# Patient Record
Sex: MALE | Race: WHITE | NOT HISPANIC OR LATINO | Employment: OTHER | URBAN - NONMETROPOLITAN AREA
[De-identification: names, ages, dates, MRNs, and addresses within clinical notes are randomized per-mention and may not be internally consistent; named-entity substitution may affect disease eponyms.]

---

## 2020-08-03 ENCOUNTER — TRANSCRIBE ORDERS (OUTPATIENT)
Dept: ADMINISTRATIVE | Facility: HOSPITAL | Age: 60
End: 2020-08-03

## 2020-08-03 DIAGNOSIS — Z01.818 OTHER SPECIFIED PRE-OPERATIVE EXAMINATION: Primary | ICD-10-CM

## 2021-02-18 ENCOUNTER — IMMUNIZATION (OUTPATIENT)
Dept: VACCINE CLINIC | Facility: HOSPITAL | Age: 61
End: 2021-02-18

## 2021-02-18 PROCEDURE — 91300 HC SARSCOV02 VAC 30MCG/0.3ML IM: CPT | Performed by: INTERNAL MEDICINE

## 2021-02-18 PROCEDURE — 0001A: CPT | Performed by: INTERNAL MEDICINE

## 2021-02-26 ENCOUNTER — OFFICE VISIT (OUTPATIENT)
Dept: ENDOCRINOLOGY | Facility: CLINIC | Age: 61
End: 2021-02-26

## 2021-02-26 VITALS
SYSTOLIC BLOOD PRESSURE: 144 MMHG | HEART RATE: 77 BPM | OXYGEN SATURATION: 94 % | DIASTOLIC BLOOD PRESSURE: 86 MMHG | WEIGHT: 315 LBS | BODY MASS INDEX: 40.43 KG/M2 | TEMPERATURE: 97.1 F | HEIGHT: 74 IN

## 2021-02-26 DIAGNOSIS — E10.649 TYPE 1 DIABETES MELLITUS WITH HYPOGLYCEMIA AND WITHOUT COMA (HCC): Primary | ICD-10-CM

## 2021-02-26 LAB — HBA1C MFR BLD: 6.6 %

## 2021-02-26 PROCEDURE — 95251 CONT GLUC MNTR ANALYSIS I&R: CPT | Performed by: INTERNAL MEDICINE

## 2021-02-26 PROCEDURE — 99213 OFFICE O/P EST LOW 20 MIN: CPT | Performed by: INTERNAL MEDICINE

## 2021-02-26 RX ORDER — BLOOD-GLUCOSE METER
EACH MISCELLANEOUS
Qty: 1 KIT | Refills: 0 | Status: SHIPPED | OUTPATIENT
Start: 2021-02-26

## 2021-02-26 RX ORDER — INSULIN PUMP CARTRIDGE
CARTRIDGE (EA) SUBCUTANEOUS
COMMUNITY
Start: 2020-12-19 | End: 2021-05-12

## 2021-02-26 RX ORDER — BACLOFEN 10 MG/1
10 TABLET ORAL DAILY PRN
COMMUNITY
Start: 2020-12-14

## 2021-02-26 RX ORDER — INFUSION SET FOR INSULIN PUMP
INFUSION SETS-PARAPHERNALIA MISCELLANEOUS
COMMUNITY
Start: 2020-12-19 | End: 2021-05-12

## 2021-02-26 RX ORDER — VALSARTAN AND HYDROCHLOROTHIAZIDE 320; 12.5 MG/1; MG/1
1 TABLET, FILM COATED ORAL DAILY
COMMUNITY
Start: 2021-02-03

## 2021-02-26 RX ORDER — PROCHLORPERAZINE 25 MG/1
SUPPOSITORY RECTAL
COMMUNITY
Start: 2021-02-03 | End: 2021-09-27

## 2021-02-26 RX ORDER — ATORVASTATIN CALCIUM 40 MG/1
40 TABLET, FILM COATED ORAL NIGHTLY
COMMUNITY
Start: 2020-12-29

## 2021-02-26 RX ORDER — BUDESONIDE 3 MG/1
3 CAPSULE, COATED PELLETS ORAL NIGHTLY
COMMUNITY
Start: 2021-02-03

## 2021-02-26 NOTE — PROGRESS NOTES
"     Office Note      Date: 2021  Patient Name: Piotr Kc  MRN: [id number]  : 1960    Chief Complaint   Patient presents with   • Diabetes       History of Present Illness:   Piotr Kc is a 60 y.o. male who presents for Diabetes type 1. Diagnosed in: . Treated in past with insulin. Current treatments: insulin in a tandem pump using control Iq. Number of insulin shots per day: none. Is on a pump  Checks blood sugar >4 times a day. Has low blood sugar: occasional.   His pump automatically adjusts insulin every 5 minutes to control his blood sugar      Changes in health since last visit: none. Last eye exam up to date  Full labs are up to date  None .    dexcom report shows mild hyperglycemia after supper.. has had some nocturnal hypoglycemia.. 2 % of the time according to the report     Recent a1c was 6.6        Review of Systems:   Review of Systems   Constitutional: Negative.    HENT: Negative.    Eyes: Negative.    Respiratory: Negative.    All other systems reviewed and are negative.      The following portions of the patient's history were reviewed and updated as appropriate: allergies, current medications, past family history, past medical history, past social history, past surgical history and problem list.    Objective     Visit Vitals  /86   Pulse 77   Temp 97.1 °F (36.2 °C) (Infrared)   Ht 188 cm (74\")   Wt (!) 158 kg (348 lb 9.6 oz)   SpO2 94%   BMI 44.76 kg/m²       Labs:    CMP  No results found for: GLUCOSE, BUN, CREATININE, EGFRIFNONA, EGFRIFAFRI, BCR, K, CO2, CALCIUM, PROTENTOTREF, LABIL2, BILIRUBIN, AST, ALT     CBC w/DIFF  No results found for: WBC, RBC, HGB, HCT, MCV, MCH, MCHC, RDW, RDWSD, MPV, PLT, NEUTRORELPCT, LYMPHORELPCT, MONORELPCT, EOSRELPCT, BASORELPCT, AUTOIGPER, NEUTROABS, LYMPHSABS, MONOSABS, EOSABS, BASOSABS, AUTOIGNUM, NRBC    Physical Exam:  Physical Exam  Constitutional:       Appearance: Normal appearance. He is obese.   HENT:      Head: " Normocephalic and atraumatic.   Neurological:      Mental Status: He is alert and oriented to person, place, and time.   Psychiatric:         Mood and Affect: Mood normal.         Thought Content: Thought content normal.         Judgment: Judgment normal.          Assessment / Plan      Assessment & Plan:  Problem List Items Addressed This Visit        Other    Type 1 diabetes mellitus with hypoglycemia (CMS/HCC) - Primary    Current Assessment & Plan     Diabetes is improving with treatment.   Continue current treatment regimen.  Diabetes will be reassessed in 6 months     WILL REDUCE NOCTURNAL BASAL RATE DUE TO NOCTURNAL HYPOGLYCEMIA.         Relevant Medications    NovoLOG 100 UNIT/ML injection           Teo Townsend MD   02/26/2021

## 2021-02-26 NOTE — ASSESSMENT & PLAN NOTE
Diabetes is improving with treatment.   Continue current treatment regimen.  Diabetes will be reassessed in 6 months     WILL REDUCE NOCTURNAL BASAL RATE DUE TO NOCTURNAL HYPOGLYCEMIA.

## 2021-03-11 ENCOUNTER — IMMUNIZATION (OUTPATIENT)
Dept: VACCINE CLINIC | Facility: HOSPITAL | Age: 61
End: 2021-03-11

## 2021-03-11 PROCEDURE — 0002A: CPT | Performed by: INTERNAL MEDICINE

## 2021-03-11 PROCEDURE — 91300 HC SARSCOV02 VAC 30MCG/0.3ML IM: CPT | Performed by: INTERNAL MEDICINE

## 2021-05-10 ENCOUNTER — PRIOR AUTHORIZATION (OUTPATIENT)
Dept: ENDOCRINOLOGY | Facility: CLINIC | Age: 61
End: 2021-05-10

## 2021-05-10 NOTE — TELEPHONE ENCOUNTER
[employer] Key: [id number] - PA Case ID: [id number] help? Call us at [phone]  Outcome  Appro[place] May 7  Your PA request has been approved. Additional information will be provided in the approval communication. (Message 1145)  Drug  T:slim X2 3mL Cartridge  Form  [employer] PA Form (2017 [employer])

## 2021-05-12 ENCOUNTER — TELEPHONE (OUTPATIENT)
Dept: ENDOCRINOLOGY | Facility: CLINIC | Age: 61
End: 2021-05-12

## 2021-05-12 RX ORDER — INFUSION SET FOR INSULIN PUMP
1 INFUSION SETS-PARAPHERNALIA MISCELLANEOUS
Qty: 45 EACH | Refills: 3 | Status: SHIPPED | OUTPATIENT
Start: 2021-05-12 | End: 2022-04-26

## 2021-05-12 RX ORDER — INSULIN PUMP CARTRIDGE
1 CARTRIDGE (EA) SUBCUTANEOUS
Qty: 45 EACH | Refills: 3 | Status: SHIPPED | OUTPATIENT
Start: 2021-05-12 | End: 2022-04-26

## 2021-05-12 NOTE — TELEPHONE ENCOUNTER
Your PA request has been closed. [id number]  Drug  AutoSoft XC Infusion Set  Form  [facility] Electronic PA Form (2017 NCPDP)

## 2021-07-21 ENCOUNTER — TRANSCRIBE ORDERS (OUTPATIENT)
Dept: ADMINISTRATIVE | Facility: HOSPITAL | Age: 61
End: 2021-07-21

## 2021-07-21 ENCOUNTER — HOSPITAL ENCOUNTER (OUTPATIENT)
Dept: GENERAL RADIOLOGY | Facility: HOSPITAL | Age: 61
Discharge: HOME | End: 2021-07-21
Admitting: INTERNAL MEDICINE

## 2021-07-21 DIAGNOSIS — K50.80 CROHN'S DISEASE OF BOTH SMALL AND LARGE INTESTINE WITHOUT COMPLICATION (HCC): Primary | ICD-10-CM

## 2021-07-21 DIAGNOSIS — K50.80 CROHN'S DISEASE OF BOTH SMALL AND LARGE INTESTINE WITHOUT COMPLICATION (HCC): ICD-10-CM

## 2021-07-21 PROCEDURE — 74250 X-RAY XM SM INT 1CNTRST STD: CPT

## 2021-07-21 PROCEDURE — 74250 X-RAY XM SM INT 1CNTRST STD: CPT | Performed by: RADIOLOGY

## 2021-09-09 ENCOUNTER — OFFICE VISIT (OUTPATIENT)
Dept: ENDOCRINOLOGY | Facility: CLINIC | Age: 61
End: 2021-09-09

## 2021-09-09 VITALS
OXYGEN SATURATION: 97 % | TEMPERATURE: 97.3 F | WEIGHT: 315 LBS | HEIGHT: 74 IN | DIASTOLIC BLOOD PRESSURE: 72 MMHG | SYSTOLIC BLOOD PRESSURE: 120 MMHG | BODY MASS INDEX: 40.43 KG/M2 | HEART RATE: 88 BPM | RESPIRATION RATE: 20 BRPM

## 2021-09-09 DIAGNOSIS — E10.649 TYPE 1 DIABETES MELLITUS WITH HYPOGLYCEMIA AND WITHOUT COMA (HCC): Primary | ICD-10-CM

## 2021-09-09 LAB — HBA1C MFR BLD: 6.7 %

## 2021-09-09 PROCEDURE — 99213 OFFICE O/P EST LOW 20 MIN: CPT | Performed by: INTERNAL MEDICINE

## 2021-09-09 RX ORDER — DEXLANSOPRAZOLE 60 MG/1
60 CAPSULE, DELAYED RELEASE ORAL 2 TIMES DAILY
COMMUNITY
End: 2022-02-10

## 2021-09-09 RX ORDER — FAMOTIDINE 40 MG/1
40 TABLET, FILM COATED ORAL DAILY
COMMUNITY
End: 2023-02-09

## 2021-09-09 NOTE — PROGRESS NOTES
"     Office Note      Date: 2021  Patient Name: Piotr Kc  MRN: [id number]  : 1960    Chief Complaint   Patient presents with   • Diabetes     follow up       History of Present Illness:   Piotr Kc is a 60 y.o. male who presents for Diabetes - type1.  Uses insulin in a tandem pump with dexcom.  Bg is check 288 times per day with dexcom.  Insulin doses are adjusted frequently based upon the readings.  Patient has occasional hypoglycemia.  Patient has been using the system during the last 3 months.    He had several issues of severe hypoglycemia that occurred when the sensor indicated high blood sugar when, in fact, his sugar was not high.  He then gave boluses and this led to hypoglycemia.   With one of those he had a bad fall and broke his leg.    Also lost weight because he was sick with gastric ulcers    Last A1c:  Hemoglobin A1C   Date Value Ref Range Status   2021 6.7 % Final   12/10/2020 6.6  Final       Changes in health since last visit: see above . Last eye exam scheduled.     Subjective          Review of Systems:   Review of Systems   Constitutional: Negative.    HENT: Negative.        The following portions of the patient's history were reviewed and updated as appropriate: allergies, current medications, past family history, past medical history, past social history, past surgical history and problem list.    Objective     Visit Vitals  /72 (BP Location: Right arm, Patient Position: Sitting, Cuff Size: Adult)   Pulse 88   Temp 97.3 °F (36.3 °C) (Infrared)   Resp 20   Ht 188 cm (74\")   Wt (!) 150 kg (331 lb 6.4 oz)   SpO2 97%   BMI 42.55 kg/m²       Labs:    CMP  No results found for: GLUCOSE, BUN, CREATININE, EGFRIFNONA, EGFRIFAFRI, BCR, K, CO2, CALCIUM, PROTENTOTREF, LABIL2, BILIRUBIN, AST, ALT     CBC w/DIFF  No results found for: WBC, RBC, HGB, HCT, MCV, MCH, MCHC, RDW, RDWSD, MPV, PLT, NEUTRORELPCT, LYMPHORELPCT, MONORELPCT, EOSRELPCT, BASORELPCT, " AUTOIGPER, NEUTROABS, LYMPHSABS, MONOSABS, EOSABS, BASOSABS, AUTOIGNUM, NRBC    Physical Exam:  Physical Exam  Vitals reviewed.   Constitutional:       Appearance: Normal appearance. He is normal weight.   Cardiovascular:      Pulses:           Dorsalis pedis pulses are 2+ on the right side and 2+ on the left side.        Posterior tibial pulses are 2+ on the right side and 2+ on the left side.   Musculoskeletal:      Right foot: Normal range of motion. No deformity, bunion, Charcot foot, foot drop or prominent metatarsal heads.      Left foot: Normal range of motion. No deformity, bunion, Charcot foot, foot drop or prominent metatarsal heads.   Feet:      Right foot:      Protective Sensation: 5 sites tested. 5 sites sensed.      Skin integrity: Skin integrity normal.      Toenail Condition: Right toenails are normal.      Left foot:      Protective Sensation: 5 sites tested. 5 sites sensed.      Skin integrity: Skin integrity normal.      Toenail Condition: Left toenails are normal.      Comments: Diabetic Foot Exam Performed and Monofilament Test Performed    Neurological:      Mental Status: He is alert.   Psychiatric:         Mood and Affect: Mood normal.         Thought Content: Thought content normal.         Judgment: Judgment normal.          Assessment / Plan      Assessment & Plan:  Problem List Items Addressed This Visit        Other    Type 1 diabetes mellitus with hypoglycemia (CMS/[facility]) - Primary    Current Assessment & Plan     The a1c is excellent but the hypoglycemia is of concern and is related to sensor malfunction. I will alert the company of the situation. He will calibrate the sensor daily and if things seem out of line will do fingersticks.          Relevant Medications    NovoLOG 100 UNIT/ML injection    Other Relevant Orders    POC Glycosylated Hemoglobin (Hb A1C) (Completed)           Teo Townsend MD   09/09/2021

## 2021-09-09 NOTE — ASSESSMENT & PLAN NOTE
The a1c is excellent but the hypoglycemia is of concern and is related to sensor malfunction. I will alert the company of the situation. He will calibrate the sensor daily and if things seem out of line will do fingersticks.

## 2021-09-10 ENCOUNTER — IMMUNIZATION (OUTPATIENT)
Dept: VACCINE CLINIC | Facility: HOSPITAL | Age: 61
End: 2021-09-10

## 2021-09-10 PROCEDURE — 91300 HC SARSCOV02 VAC 30MCG/0.3ML IM: CPT | Performed by: INTERNAL MEDICINE

## 2021-09-10 PROCEDURE — 0003A: CPT | Performed by: INTERNAL MEDICINE

## 2021-09-27 RX ORDER — PROCHLORPERAZINE 25 MG/1
SUPPOSITORY RECTAL
Qty: 3 EACH | Refills: 3 | Status: SHIPPED | OUTPATIENT
Start: 2021-09-27 | End: 2022-03-21

## 2021-09-27 RX ORDER — PROCHLORPERAZINE 25 MG/1
SUPPOSITORY RECTAL
Qty: 1 EACH | Refills: 3 | Status: SHIPPED | OUTPATIENT
Start: 2021-09-27 | End: 2022-04-21

## 2021-12-17 NOTE — TELEPHONE ENCOUNTER
PT CALLED REQUESTING NOVLOG TO BE SENT [facility] TO [facility] IN [place]. PLEASE AND THANK YOU

## 2021-12-23 NOTE — TELEPHONE ENCOUNTER
PT's wife called and PT is not getting enough insulin filled from the pharmacy, she states he gets 3 bottles and is running out and using about 5000 Units a month of insulin and needs 5 bottles. Averages 165-170 units a day. Please give them a call at [phone] or wife at [phone].    Pharmacy is [facility] in [place].    Pt called 12/17/21 for refills.

## 2021-12-27 ENCOUNTER — PRIOR AUTHORIZATION (OUTPATIENT)
Dept: ENDOCRINOLOGY | Facility: CLINIC | Age: 61
End: 2021-12-27

## 2022-02-10 ENCOUNTER — SPECIALTY PHARMACY (OUTPATIENT)
Dept: PHARMACY | Facility: HOSPITAL | Age: 62
End: 2022-02-10

## 2022-02-10 ENCOUNTER — OFFICE VISIT (OUTPATIENT)
Dept: ENDOCRINOLOGY | Facility: CLINIC | Age: 62
End: 2022-02-10

## 2022-02-10 VITALS
TEMPERATURE: 98.6 F | BODY MASS INDEX: 40.43 KG/M2 | HEART RATE: 85 BPM | DIASTOLIC BLOOD PRESSURE: 76 MMHG | WEIGHT: 315 LBS | HEIGHT: 74 IN | OXYGEN SATURATION: 93 % | SYSTOLIC BLOOD PRESSURE: 144 MMHG

## 2022-02-10 DIAGNOSIS — E10.649 TYPE 1 DIABETES MELLITUS WITH HYPOGLYCEMIA AND WITHOUT COMA: Primary | ICD-10-CM

## 2022-02-10 LAB
EXPIRATION DATE: ABNORMAL
HBA1C MFR BLD: 7.4 %
Lab: ABNORMAL

## 2022-02-10 PROCEDURE — 83036 HEMOGLOBIN GLYCOSYLATED A1C: CPT | Performed by: INTERNAL MEDICINE

## 2022-02-10 PROCEDURE — 95251 CONT GLUC MNTR ANALYSIS I&R: CPT | Performed by: INTERNAL MEDICINE

## 2022-02-10 PROCEDURE — 99213 OFFICE O/P EST LOW 20 MIN: CPT | Performed by: INTERNAL MEDICINE

## 2022-02-10 RX ORDER — DEXLANSOPRAZOLE 30 MG/1
30 CAPSULE, DELAYED RELEASE ORAL DAILY
COMMUNITY
End: 2022-08-11

## 2022-02-10 NOTE — PROGRESS NOTES
Specialty Pharmacy Patient Management Program  Endocrinology Initial Assessment       Piotr Kc is a 61 y.o. male with Type 1 Diabetes seen by an Endocrinology provider and enrolled in the Endocrinology Patient Management program offered by [facility].  An initial outreach was conducted, including assessment of therapy appropriateness and specialty medication education for insulin therapy, CGM.  The patient was introduced to services offered by [facility], including: regular assessments, refill coordination, curbside pick-up or mail order delivery options, prior authorization maintenance, and financial assistance programs as applicable. The patient was also provided with contact information for the pharmacy team.    Patient is using a Tandem pump with Dexcom. He reports some hypoglycemia. Patient has been using the Dexcom + Tandem for the last few months.        Relevant Past Medical History and Comorbidities  Relevant medical history and concomitant health conditions were discussed with the patient. The patient's chart has been reviewed for relevant past medical history and comorbid health conditions and updated as necessary.   Past Medical History:   Diagnosis Date   • Diabetes mellitus (HCC)    • Diabetes mellitus type I (HCC)    • Hypertension    • Insulin pump in place    • Obesity      Social History     Socioeconomic History   • Marital status:    Tobacco Use   • Smoking status: Never Smoker   • Smokeless tobacco: Never Used   Vaping Use   • Vaping Use: Never used   Substance and Sexual Activity   • Alcohol use: Never   • Drug use: Never   • Sexual activity: Defer       Allergies  Known allergies and reactions were discussed with the patient. The patient's chart has been reviewed for  allergy information and updated as necessary.   Keflex [cephalexin]    Current Medication List  This medication list has been reviewed with the patient and  evaluated for any interactions or necessary modifications/recommendations, and updated to include all prescription medications, OTC medications, and supplements the patient is currently taking.  This list reflects what is contained in the patient's profile, which has also been marked as reviewed to communicate to other providers it is the most up to date version of the patient's current medication therapy.     Current Outpatient Medications:   •  atorvastatin (LIPITOR) 40 MG tablet, , Disp: , Rfl:   •  baclofen (LIORESAL) 10 MG tablet, , Disp: , Rfl:   •  Blood Glucose Monitoring Suppl (Accu-Chek Dolores Plus) w/Device kit, DEVISE FOR CHECKING BG, Disp: 1 kit, Rfl: 0  •  Budesonide (ENTOCORT EC) 3 MG 24 hr capsule, , Disp: , Rfl:   •  Continuous Blood Gluc Sensor (Dexcom G6 Sensor), FOR CHECKING BLOOD SUGAR, CHANGE EVERY 10 DAYS, Disp: 3 each, Rfl: 3  •  Continuous Blood Gluc Transmit (Dexcom G6 Transmitter) misc, FOR CHECKING BLOOD SUGAR, CHANGE EVERY 90 DAYS, Disp: 1 each, Rfl: 3  •  dexlansoprazole (DEXILANT) 30 MG capsule, Take 30 mg by mouth Daily., Disp: , Rfl:   •  famotidine (PEPCID) 40 MG tablet, Take 40 mg by mouth Daily., Disp: , Rfl:   •  HumaLOG 100 UNIT/ML injection, Use via insulin pump.  Max daily dose 170 units, Disp: 160 mL, Rfl: 0  •  Insulin Infusion Pump Supplies (AutoSoft XC Infusion Set) misc, 1 each by Other route Every 3 (Three) Days., Disp: 45 each, Rfl: 3  •  Insulin Infusion Pump Supplies (T:slim t:lock Insulin Cart 3ml) misc, 1 each by Other route Every 3 (Three) Days., Disp: 45 each, Rfl: 3  •  NovoLOG 100 UNIT/ML injection, Use via insulin pump.  Max daily dose 170 units, Disp: 160 mL, Rfl: 0  •  valsartan-hydrochlorothiazide (DIOVAN-HCT) 320-12.5 MG per tablet, , Disp: , Rfl:     Drug Interactions  None      Recommended Medications Assessment  • Aspirin - Not Indicated  • Statin - Currently Taking  • ACEi/ARB - Currently Taking    Current 10-Year ASCVD Risk:     Relevant Laboratory  Values  A1C Last 3 Results    HGBA1C Last 3 Results 9/9/21 2/10/22   Hemoglobin A1C 6.7 7.4           Lab Results   Component Value Date    HGBA1C 7.4 02/10/2022     No results found for: GLUCOSE, CALCIUM, NA, K, CO2, CL, BUN, CREATININE, EGFRIFAFRI, EGFRIFNONA, BCR, ANIONGAP  No results found for: CHOL, CHLPL, TRIG, HDL, LDL, LDLDIRECT        Adherence and Self-Administration  • Barriers to Patient Adherence and/or Self-Administration: None  Methods for Supporting Patient Adherence and/or Self-Administration: None required     Vaccination Status:   COVID 19: UTD  Influenza: Needs- not interested at this time  Pneumococcal: UTD  Hep B: unsure      Goals of Therapy  • Patient Goals of Therapy: Take medication as prescribed   • Clinical Goals or Therapeutic Targets, If Applicable: A1c reduction       Reassessment Plan & Follow-Up  1. No pharmacologic recommendations at this time      Patient does not wish to use [facility] at this time due to:     Loyalty to independent pharmacy    Kristy Bhatt, PharmD  2/10/2022  15:40 EST

## 2022-02-10 NOTE — PROGRESS NOTES
"     Office Note      Date: 02/10/2022  Patient Name: Piotr Kc  MRN: [id number]  : 1960    Chief Complaint   Patient presents with   • Follow-up     Diabetes type 1       History of Present Illness:   Piotr Kc is a 61 y.o. male who presents for Diabetes - type 1  On insulin in a tandem pump with dexcom.   Bg is check 288 times per day with dexcom.  Insulin doses are adjusted frequently based upon the readings.  Patient has occasional hypoglycemia.  Patient has been using the system during the last 3 months.  2 weeks of dexcom data were reviewed. 55 % in range. 37 % high, 7 % very high. No hypos.  Hyperglycemia after breakfast noted      Last A1c:  Hemoglobin A1C   Date Value Ref Range Status   02/10/2022 7.4 % Final   12/10/2020 6.6  Final       Changes in health since last visit: none . Last eye exam up to date.    Subjective        Review of Systems:   Review of Systems   Constitutional: Negative.    HENT: Negative.    Eyes: Negative.    Respiratory: Negative.        The following portions of the patient's history were reviewed and updated as appropriate: allergies, current medications, past family history, past medical history, past social history, past surgical history and problem list.    Objective     Visit Vitals  /76 (BP Location: Right arm, Patient Position: Sitting, Cuff Size: Large Adult)   Pulse 85   Temp 98.6 °F (37 °C) (Oral)   Ht 188 cm (74\")   Wt (!) 158 kg (347 lb 9.6 oz)   SpO2 93%   BMI 44.63 kg/m²       Labs:    CMP  No results found for: GLUCOSE, BUN, CREATININE, EGFRIFNONA, EGFRIFAFRI, BCR, K, CO2, CALCIUM, PROTENTOTREF, LABIL2, BILIRUBIN, AST, ALT     CBC w/DIFF  No results found for: WBC, RBC, HGB, HCT, MCV, MCH, MCHC, RDW, RDWSD, MPV, PLT, NEUTRORELPCT, LYMPHORELPCT, MONORELPCT, EOSRELPCT, BASORELPCT, AUTOIGPER, NEUTROABS, LYMPHSABS, MONOSABS, EOSABS, BASOSABS, AUTOIGNUM, NRBC    Physical Exam:  Physical Exam  Vitals reviewed.   Constitutional:       " Appearance: Normal appearance.   Psychiatric:         Mood and Affect: Mood normal.         Thought Content: Thought content normal.         Judgment: Judgment normal.          Assessment / Plan      Assessment & Plan:  Problem List Items Addressed This Visit        Other    Type 1 diabetes mellitus with hypoglycemia (HCC) - Primary    Current Assessment & Plan     Diabetes is unchanged.   Continue current treatment regimen.  Diabetes will be reassessed in 6 months.         Relevant Medications    NovoLOG 100 UNIT/ML injection    HumaLOG 100 UNIT/ML injection    Other Relevant Orders    POC Glycosylated Hemoglobin (Hb A1C) (Completed)           Teo Townsend MD   02/10/2022

## 2022-03-21 RX ORDER — PROCHLORPERAZINE 25 MG/1
SUPPOSITORY RECTAL
Qty: 3 EACH | Refills: 3 | Status: SHIPPED | OUTPATIENT
Start: 2022-03-21 | End: 2022-03-21 | Stop reason: SDUPTHER

## 2022-03-21 RX ORDER — PROCHLORPERAZINE 25 MG/1
SUPPOSITORY RECTAL
Qty: 9 EACH | Refills: 3 | Status: SHIPPED | OUTPATIENT
Start: 2022-03-21

## 2022-04-21 RX ORDER — PROCHLORPERAZINE 25 MG/1
SUPPOSITORY RECTAL
Qty: 1 EACH | Refills: 3 | Status: SHIPPED | OUTPATIENT
Start: 2022-04-21 | End: 2022-12-19

## 2022-04-26 RX ORDER — INSULIN PUMP CARTRIDGE
CARTRIDGE (EA) SUBCUTANEOUS
Qty: 45 EACH | Refills: 5 | Status: SHIPPED | OUTPATIENT
Start: 2022-04-26

## 2022-04-26 RX ORDER — INFUSION SET FOR INSULIN PUMP
INFUSION SETS-PARAPHERNALIA MISCELLANEOUS
Qty: 45 EACH | Refills: 5 | Status: SHIPPED | OUTPATIENT
Start: 2022-04-26

## 2022-05-10 ENCOUNTER — TRANSCRIBE ORDERS (OUTPATIENT)
Dept: ADMINISTRATIVE | Facility: HOSPITAL | Age: 62
End: 2022-05-10

## 2022-05-10 DIAGNOSIS — K50.912 CROHN'S DISEASE WITH INTESTINAL OBSTRUCTION, UNSPECIFIED GASTROINTESTINAL TRACT LOCATION: Primary | ICD-10-CM

## 2022-05-11 ENCOUNTER — HOSPITAL ENCOUNTER (OUTPATIENT)
Dept: CT IMAGING | Facility: HOSPITAL | Age: 62
Discharge: HOME | End: 2022-05-11
Admitting: INTERNAL MEDICINE

## 2022-05-11 DIAGNOSIS — K50.912 CROHN'S DISEASE WITH INTESTINAL OBSTRUCTION, UNSPECIFIED GASTROINTESTINAL TRACT LOCATION: ICD-10-CM

## 2022-05-11 LAB — CREAT BLDA-MCNC: 0.9 MG/DL (ref 0.6–1.3)

## 2022-05-11 PROCEDURE — 74177 CT ABD & PELVIS W/CONTRAST: CPT | Performed by: RADIOLOGY

## 2022-05-11 PROCEDURE — 25010000002 IOPAMIDOL 61 % SOLUTION: Performed by: INTERNAL MEDICINE

## 2022-05-11 PROCEDURE — 82565 ASSAY OF CREATININE: CPT

## 2022-05-11 PROCEDURE — 74177 CT ABD & PELVIS W/CONTRAST: CPT

## 2022-05-11 RX ADMIN — IOPAMIDOL 89 ML: 612 INJECTION, SOLUTION INTRAVENOUS at 10:46

## 2022-05-23 ENCOUNTER — TELEPHONE (OUTPATIENT)
Dept: ENDOCRINOLOGY | Facility: CLINIC | Age: 62
End: 2022-05-23

## 2022-05-23 NOTE — TELEPHONE ENCOUNTER
PATIENT WOULD LIKE A CALL BACK TO DISCUSS ISSUES HE IS HAVING TRYING TO GET T-SLIM PUMP. HE STATES HE RECEIVED A LETTER STATING ADDITIONAL INFORMATION WAS NEEDED FROM OUR OFFICE AND WOULD NOT BE ABLE TO GET PUMP TILL June 6, 2022. PATIENTS NUMBER [phone]

## 2022-08-11 ENCOUNTER — OFFICE VISIT (OUTPATIENT)
Dept: ENDOCRINOLOGY | Facility: CLINIC | Age: 62
End: 2022-08-11

## 2022-08-11 VITALS
OXYGEN SATURATION: 97 % | DIASTOLIC BLOOD PRESSURE: 82 MMHG | BODY MASS INDEX: 40.43 KG/M2 | WEIGHT: 315 LBS | SYSTOLIC BLOOD PRESSURE: 130 MMHG | HEIGHT: 74 IN | HEART RATE: 92 BPM

## 2022-08-11 DIAGNOSIS — E10.649 TYPE 1 DIABETES MELLITUS WITH HYPOGLYCEMIA AND WITHOUT COMA: Primary | ICD-10-CM

## 2022-08-11 LAB — GLUCOSE BLDC GLUCOMTR-MCNC: 95 MG/DL (ref 70–130)

## 2022-08-11 PROCEDURE — 99213 OFFICE O/P EST LOW 20 MIN: CPT | Performed by: NURSE PRACTITIONER

## 2022-08-11 PROCEDURE — 95251 CONT GLUC MNTR ANALYSIS I&R: CPT | Performed by: NURSE PRACTITIONER

## 2022-08-11 RX ORDER — ESOMEPRAZOLE MAGNESIUM 40 MG/1
40 FOR SUSPENSION ORAL 2 TIMES DAILY WITH MEALS
COMMUNITY
Start: 2022-03-30

## 2022-08-11 RX ORDER — PRUCALOPRIDE 2 MG/1
1 TABLET, FILM COATED ORAL DAILY
COMMUNITY
Start: 2022-08-08

## 2022-08-11 NOTE — ASSESSMENT & PLAN NOTE
-Diabetes is not at goal with A1c 7.9.  -Discussed dietary and exercise guidelines.  -Discussed importance of yearly eye exams.  -Discussed importance of taking insulin as meal times to control PPD values.  Continue using Dexcom CGM with Tandem insulin pump for control of BG's.  Data download shows the following:  Above Target: 66%  In Range: 29%  Below Range: 6%  Basal 43%  Food Bolus 53%   Trend shows that he is having some increased hypoglycemia throughout the day prior to meals.  Will make adjustments as below:  12A: 1.500 u/hr  3A: 1.900 u/hr  6A: 3.15 u/hr  10:30 P: 2.900 u/hr  -S/S hypoglycemia reviewed with Rule of 15's advised.  -Follow-up in 6 months.

## 2022-08-11 NOTE — PROGRESS NOTES
"No chief complaint on file.         HPI   Piotr Kc is a 61 y.o. male had no chief complaint listed for this encounter.      He uses a Dexcom CGM with tandem insulin pump for control of his BG's.  Most recent optho exam: [facility]  Place of optho exam:  Hypos: occasionally.  He has been having some overt lows throughout the day that is causing him to not administer meal time doses in fear of becoming too hypo after meals.  Most recent A1c 7.9 (7/2022)  Current pump settings:  12A: 1.500 u/hr  3A: 1.900 u/hr  6A: 3.330 u/hr  10:30 P: 2.900 u/hr    The following portions of the patient's history were reviewed and updated as appropriate: allergies, current medications, past family history, past medical history, past social history, past surgical history and problem list.      Review of Systems   Constitutional: Negative.    Eyes: Negative.    Endocrine: Negative.    Psychiatric/Behavioral: Negative.    All other systems reviewed and are negative.       Physical Exam  Vitals reviewed.   Constitutional:       Appearance: Normal appearance.   Eyes:      Extraocular Movements: Extraocular movements intact.   Cardiovascular:      Rate and Rhythm: Tachycardia present.   Pulmonary:      Effort: Pulmonary effort is normal.   Skin:     General: Skin is warm.   Neurological:      Mental Status: He is alert and oriented to person, place, and time.   Psychiatric:         Mood and Affect: Mood normal.         Behavior: Behavior normal.         Thought Content: Thought content normal.         Judgment: Judgment normal.        /82 (BP Location: Left arm, Patient Position: Sitting, Cuff Size: Adult)   Pulse 92   Ht 188 cm (74\")   Wt (!) 155 kg (341 lb)   SpO2 97%   BMI 43.78 kg/m²      Labs and imaging    CMP:  Lab Results   Component Value Date    CREATININE 0.90 05/11/2022     Lipid Panel:  No results found for: CHOL, TRIG, HDL, VLDL, LDL  HbA1c:  Lab Results   Component Value Date    HGBA1C 7.4 02/10/2022    " HGBA1C 6.7 09/09/2021     Glucose:    Lab Results   Component Value Date    POCGLU 95 08/11/2022     Microalbumin:  No results found for: MALBCRECLAUDEO  TSH:  No results found for: TSH    Assessment and plan  Diagnoses and all orders for this visit:    1. Type 1 diabetes mellitus with hypoglycemia and without coma (HCC) (Primary)  Assessment & Plan:  -Diabetes is not at goal with A1c 7.9.  -Discussed dietary and exercise guidelines.  -Discussed importance of yearly eye exams.  -Discussed importance of taking insulin as meal times to control PPD values.  Continue using Dexcom CGM with Tandem insulin pump for control of BG's.  Data download shows the following:  Above Target: 66%  In Range: 29%  Below Range: 6%  Basal 43%  Food Bolus 53%   Trend shows that he is having some increased hypoglycemia throughout the day prior to meals.  Will make adjustments as below:  12A: 1.500 u/hr  3A: 1.900 u/hr  6A: 3.15 u/hr  10:30 P: 2.900 u/hr  -S/S hypoglycemia reviewed with Rule of 15's advised.  -Follow-up in 6 months.    Orders:  -     POC Glucose Fingerstick       Return in about 6 months (around 2/11/2023) for Follow-up appointment, A1C. The patient was instructed to contact the clinic with any interval questions or concerns.    This document has been electronically signed by MIGUEL ANGEL Galarza  August 11, 2022 09:58 EDT  Endocrinology

## 2022-09-07 RX ORDER — INSULIN ASPART 100 [IU]/ML
INJECTION, SOLUTION INTRAVENOUS; SUBCUTANEOUS
Qty: 60 ML | Refills: 5 | Status: SHIPPED | OUTPATIENT
Start: 2022-09-07 | End: 2023-03-10 | Stop reason: SDUPTHER

## 2022-09-07 NOTE — TELEPHONE ENCOUNTER
PT CALLED REQUESTING NOVOLOG RX WRITTEN AS 6x BOTTLES PER REFILL. HE REQUESTED WE SEND [facility] IN TO [facility] [place], [place].

## 2022-09-12 ENCOUNTER — TELEPHONE (OUTPATIENT)
Dept: ENDOCRINOLOGY | Facility: CLINIC | Age: 62
End: 2022-09-12

## 2022-09-12 NOTE — TELEPHONE ENCOUNTER
PT CALLED STATING HIS INSURANCE REQUIRES A PA ON DEXOCM SENSORS AND TRANSMITTERS. HE REQUESTED [facility] PA DEPT @ PHONE # [phone]

## 2022-09-12 NOTE — TELEPHONE ENCOUNTER
Spoke with patient.  Advised that denial needed from insurance in order to do PA.   Patient verbalized understanding.

## 2022-10-10 ENCOUNTER — PRIOR AUTHORIZATION (OUTPATIENT)
Dept: ENDOCRINOLOGY | Facility: CLINIC | Age: 62
End: 2022-10-10

## 2022-10-10 NOTE — TELEPHONE ENCOUNTER
Approvedtoday  Your PA request has been approved. Additional information will be provided in the approval communication. (Message 1148)  Drug  Dexcom G6 Transmitter  Form  [employer] Electronic PA Form (2017 N[employer])    Your PA has been resolved, no additional PA is required. For further inquiries please contact the number on the back of the member prescription card. (Message 1333)  Drug  Dexcom G6 Sensor  Form  [employer] Electronic PA Form (2017 [employer]

## 2022-10-24 ENCOUNTER — APPOINTMENT (OUTPATIENT)
Dept: GENERAL RADIOLOGY | Facility: HOSPITAL | Age: 62
End: 2022-10-24

## 2022-10-24 ENCOUNTER — HOSPITAL ENCOUNTER (EMERGENCY)
Facility: HOSPITAL | Age: 62
Discharge: HOME | End: 2022-10-24
Attending: STUDENT IN AN ORGANIZED HEALTH CARE EDUCATION/TRAINING PROGRAM | Admitting: EMERGENCY MEDICINE

## 2022-10-24 VITALS
HEART RATE: 88 BPM | DIASTOLIC BLOOD PRESSURE: 85 MMHG | TEMPERATURE: 97.8 F | WEIGHT: 315 LBS | HEIGHT: 74 IN | RESPIRATION RATE: 20 BRPM | SYSTOLIC BLOOD PRESSURE: 130 MMHG | OXYGEN SATURATION: 95 % | BODY MASS INDEX: 40.43 KG/M2

## 2022-10-24 DIAGNOSIS — T07.XXXA MULTIPLE LACERATIONS: Primary | ICD-10-CM

## 2022-10-24 PROCEDURE — 73590 X-RAY EXAM OF LOWER LEG: CPT

## 2022-10-24 PROCEDURE — 99284 EMERGENCY DEPT VISIT MOD MDM: CPT

## 2022-10-24 PROCEDURE — 96372 THER/PROPH/DIAG INJ SC/IM: CPT

## 2022-10-24 PROCEDURE — 25010000002 MORPHINE PER 10 MG

## 2022-10-24 PROCEDURE — 73590 X-RAY EXAM OF LOWER LEG: CPT | Performed by: RADIOLOGY

## 2022-10-24 RX ORDER — LIDOCAINE HYDROCHLORIDE AND EPINEPHRINE 10; 10 MG/ML; UG/ML
20 INJECTION, SOLUTION INFILTRATION; PERINEURAL ONCE
Status: COMPLETED | OUTPATIENT
Start: 2022-10-24 | End: 2022-10-24

## 2022-10-24 RX ORDER — ACETAMINOPHEN 325 MG/1
650 TABLET ORAL ONCE
Status: COMPLETED | OUTPATIENT
Start: 2022-10-24 | End: 2022-10-24

## 2022-10-24 RX ADMIN — MORPHINE SULFATE 4 MG: 4 INJECTION, SOLUTION INTRAMUSCULAR; INTRAVENOUS at 17:48

## 2022-10-24 RX ADMIN — LIDOCAINE HYDROCHLORIDE AND EPINEPHRINE 20 ML: 10; 10 INJECTION, SOLUTION INFILTRATION; PERINEURAL at 19:12

## 2022-10-24 RX ADMIN — Medication 4 MG: at 17:48

## 2022-10-24 RX ADMIN — ACETAMINOPHEN 650 MG: 325 TABLET ORAL at 15:35

## 2022-10-24 NOTE — ED PROVIDER NOTES
Subjective   History of Present Illness  61 year old male with past medical hx of type I DM (with insulin pump), HTN, and obesity presents to the ED with bilateral lower extremity lacerations. Patient states he fell going down some steps and injured legs. Denies hitting his head or LOC. Denies any other injuries, complaints, or concerns at this time. He is UTD on tetanus and reports having his last booster in June/July 2022.    History provided by:  Patient   used: No        Review of Systems   Constitutional: Negative.  Negative for fever.   HENT: Negative.    Respiratory: Negative.    Cardiovascular: Negative.  Negative for chest pain.   Gastrointestinal: Negative.  Negative for abdominal pain.   Endocrine: Negative.    Genitourinary: Negative.  Negative for dysuria.   Skin: Positive for wound.   Neurological: Negative.    Psychiatric/Behavioral: Negative.    All other systems reviewed and are negative.      Past Medical History:   Diagnosis Date   • Diabetes mellitus (HCC)    • Diabetes mellitus type I (HCC)    • Hypertension    • Insulin pump in place    • Obesity        Allergies   Allergen Reactions   • Keflex [Cephalexin] Rash       No past surgical history on file.    Family History   Problem Relation Age of Onset   • Diabetes Father        Social History     Socioeconomic History   • Marital status:    Tobacco Use   • Smoking status: Never   • Smokeless tobacco: Never   Vaping Use   • Vaping Use: Never used   Substance and Sexual Activity   • Alcohol use: Never   • Drug use: Never   • Sexual activity: Defer           Objective   Physical Exam  Vitals and nursing note reviewed.   Constitutional:       General: He is not in acute distress.     Appearance: He is well-developed. He is not diaphoretic.   HENT:      Head: Normocephalic and atraumatic.      Right Ear: External ear normal.      Left Ear: External ear normal.      Nose: Nose normal.   Eyes:      Conjunctiva/sclera:  Conjunctivae normal.      Pupils: Pupils are equal, round, and reactive to light.   Neck:      Vascular: No JVD.      Trachea: No tracheal deviation.   Cardiovascular:      Rate and Rhythm: Normal rate and regular rhythm.      Heart sounds: Normal heart sounds. No murmur heard.  Pulmonary:      Effort: Pulmonary effort is normal. No respiratory distress.      Breath sounds: Normal breath sounds. No wheezing.   Abdominal:      General: Bowel sounds are normal.      Palpations: Abdomen is soft.      Tenderness: There is no abdominal tenderness.   Musculoskeletal:         General: No deformity. Normal range of motion.      Cervical back: Normal range of motion and neck supple.   Skin:     General: Skin is warm and dry.      Coloration: Skin is not pale.      Findings: Laceration present. No erythema or rash.          Neurological:      Mental Status: He is alert and oriented to person, place, and time.      Cranial Nerves: No cranial nerve deficit.   Psychiatric:         Behavior: Behavior normal.         Thought Content: Thought content normal.         Laceration Repair    Date/Time: 10/24/2022 6:56 PM  Performed by: Darrell Loyola PA-C  Authorized by: Gerri Camacho MD     Consent:     Consent obtained:  Verbal    Consent given by:  Patient    Risks, benefits, and alternatives were discussed: yes      Risks discussed:  Infection, need for additional repair, nerve damage, pain, poor cosmetic result, poor wound healing, tendon damage and vascular damage    Alternatives discussed:  No treatment, delayed treatment, observation and referral  Universal protocol:     Procedure explained and questions answered to patient or proxy's satisfaction: yes      Relevant documents present and verified: yes      Test results available: yes      Imaging studies available: yes      Required blood products, implants, devices, and special equipment available: yes      Site/side marked: yes      Immediately prior to  procedure, a time out was called: yes      Patient identity confirmed:  Verbally with patient, arm band, provided demographic data and hospital-assigned identification number  Anesthesia:     Anesthesia method:  Local infiltration    Local anesthetic:  Lidocaine 1% WITH epi  Laceration details:     Location:  Leg    Leg location:  L lower leg    Length (cm):  2.5  Pre-procedure details:     Preparation:  Patient was prepped and draped in usual sterile fashion  Exploration:     Limited defect created (wound extended): no      Hemostasis achieved with:  Direct pressure and epinephrine    Imaging outcome: foreign body not noted      Wound exploration: wound explored through full range of motion      Wound extent: no fascia violation noted, no foreign bodies/material noted and no underlying fracture noted    Treatment:     Area cleansed with:  Chlorhexidine    Amount of cleaning:  Standard    Irrigation solution:  Sterile water    Irrigation method:  Syringe  Skin repair:     Repair method:  Sutures    Suture size:  3-0    Suture material:  Nylon    Suture technique:  Simple interrupted    Number of sutures:  26  Approximation:     Approximation:  Close  Repair type:     Repair type:  Simple  Post-procedure details:     Dressing:  Non-adherent dressing    Procedure completion:  Tolerated well, no immediate complications  Comments:      Pt tolerated procedure well. No acute complication.  Laceration Repair    Date/Time: 10/24/2022 6:57 PM  Performed by: Darrell Loyola PA-C  Authorized by: Gerri Camacho MD     Consent:     Consent obtained:  Verbal    Consent given by:  Patient    Risks, benefits, and alternatives were discussed: yes      Risks discussed:  Infection, need for additional repair, pain, poor cosmetic result, nerve damage, poor wound healing, tendon damage and vascular damage    Alternatives discussed:  No treatment, delayed treatment, observation and referral  Universal protocol:     Procedure  explained and questions answered to patient or proxy's satisfaction: yes      Relevant documents present and verified: yes      Test results available: yes      Imaging studies available: yes      Required blood products, implants, devices, and special equipment available: yes      Site/side marked: yes      Immediately prior to procedure, a time out was called: yes      Patient identity confirmed:  Verbally with patient, arm band, provided demographic data and hospital-assigned identification number  Anesthesia:     Anesthesia method:  Local infiltration    Local anesthetic:  Lidocaine 1% WITH epi  Laceration details:     Location:  Leg    Leg location:  R lower leg    Length (cm):  13  Pre-procedure details:     Preparation:  Patient was prepped and draped in usual sterile fashion  Exploration:     Limited defect created (wound extended): no      Hemostasis achieved with:  Direct pressure and epinephrine    Imaging outcome: foreign body not noted      Wound exploration: wound explored through full range of motion      Wound extent: no fascia violation noted, no foreign bodies/material noted and no underlying fracture noted    Treatment:     Area cleansed with:  Chlorhexidine    Amount of cleaning:  Standard    Irrigation solution:  Sterile water    Irrigation method:  Syringe    Debridement:  None  Skin repair:     Repair method:  Sutures    Suture size:  4-0    Suture material:  Nylon    Suture technique:  Simple interrupted    Number of sutures:  6  Approximation:     Approximation:  Close  Repair type:     Repair type:  Simple  Post-procedure details:     Dressing:  Non-adherent dressing    Procedure completion:  Tolerated well, no immediate complications  Comments:      Pt tolerated procedure well. No acute complications.               ED Course  ED Course as of 10/24/22 1928   Mon Oct 24, 2022   1608 XR Tibia Fibula 2 View Right [TK]   1609 Dr. Vidal has seen and evaluated patient. Recommends consulting with  general surgery for further recommendations. [TK]   2471 Dr. Galvin has seen and evaluated patient and does not think this is a surgical case. Recommends repairing wound as best as possible, then apply wet/dry dressing. Dr. Vidal is aware. [TK]      ED Course User Index  [TK] Darrell Loyola PA-C                                           MDM  Number of Diagnoses or Management Options  Multiple lacerations: new and requires workup     Amount and/or Complexity of Data Reviewed  Tests in the radiology section of CPT®: reviewed and ordered  Discuss the patient with other providers: yes (Kamar Vidal)    Risk of Complications, Morbidity, and/or Mortality  Presenting problems: moderate  Diagnostic procedures: moderate  Management options: high        Final diagnoses:   Multiple lacerations       ED Disposition  ED Disposition     ED Disposition   Discharge    Condition   Stable    Comment   --             Kiel Kline MD  [address] [place] [address] [address] [place] [address]  [phone]    In 1 week           Medication List      No changes were made to your prescriptions during this visit.          Darrell Loyola PA-C  10/24/22 4032

## 2022-10-27 ENCOUNTER — TRANSCRIBE ORDERS (OUTPATIENT)
Dept: WOUND CARE | Facility: HOSPITAL | Age: 62
End: 2022-10-27

## 2022-10-27 ENCOUNTER — HOSPITAL ENCOUNTER (OUTPATIENT)
Dept: WOUND CARE | Facility: HOSPITAL | Age: 62
Discharge: HOME | End: 2022-10-27
Admitting: NURSE PRACTITIONER

## 2022-10-27 VITALS
RESPIRATION RATE: 18 BRPM | DIASTOLIC BLOOD PRESSURE: 71 MMHG | HEART RATE: 76 BPM | SYSTOLIC BLOOD PRESSURE: 136 MMHG | TEMPERATURE: 99.2 F

## 2022-10-27 DIAGNOSIS — S81.802A OPEN WOUND OF LEFT LOWER LEG, INITIAL ENCOUNTER: Primary | ICD-10-CM

## 2022-10-27 DIAGNOSIS — T14.8XXA WOUND, OPEN: Primary | ICD-10-CM

## 2022-10-27 LAB
ALBUMIN SERPL-MCNC: 3.86 G/DL (ref 3.5–5.2)
ALBUMIN/GLOB SERPL: 1.4 G/DL
ALP SERPL-CCNC: 77 U/L (ref 39–117)
ALT SERPL W P-5'-P-CCNC: 15 U/L (ref 1–41)
ANION GAP SERPL CALCULATED.3IONS-SCNC: 9.9 MMOL/L (ref 5–15)
AST SERPL-CCNC: 10 U/L (ref 1–40)
BASOPHILS # BLD AUTO: 0.04 10*3/MM3 (ref 0–0.2)
BASOPHILS NFR BLD AUTO: 0.5 % (ref 0–1.5)
BILIRUB SERPL-MCNC: 0.7 MG/DL (ref 0–1.2)
BUN SERPL-MCNC: 14 MG/DL (ref 8–23)
BUN/CREAT SERPL: 16.7 (ref 7–25)
CALCIUM SPEC-SCNC: 10 MG/DL (ref 8.6–10.5)
CHLORIDE SERPL-SCNC: 105 MMOL/L (ref 98–107)
CO2 SERPL-SCNC: 27.1 MMOL/L (ref 22–29)
CREAT SERPL-MCNC: 0.84 MG/DL (ref 0.76–1.27)
CRP SERPL-MCNC: 3.71 MG/DL (ref 0–0.5)
DEPRECATED RDW RBC AUTO: 46.8 FL (ref 37–54)
EGFRCR SERPLBLD CKD-EPI 2021: 99.8 ML/MIN/1.73
EOSINOPHIL # BLD AUTO: 0.02 10*3/MM3 (ref 0–0.4)
EOSINOPHIL NFR BLD AUTO: 0.3 % (ref 0.3–6.2)
ERYTHROCYTE [DISTWIDTH] IN BLOOD BY AUTOMATED COUNT: 14.9 % (ref 12.3–15.4)
ERYTHROCYTE [SEDIMENTATION RATE] IN BLOOD: 20 MM/HR (ref 0–20)
GLOBULIN UR ELPH-MCNC: 2.7 GM/DL
GLUCOSE SERPL-MCNC: 111 MG/DL (ref 65–99)
HCT VFR BLD AUTO: 45.2 % (ref 37.5–51)
HGB BLD-MCNC: 14.7 G/DL (ref 13–17.7)
IMM GRANULOCYTES # BLD AUTO: 0.02 10*3/MM3 (ref 0–0.05)
IMM GRANULOCYTES NFR BLD AUTO: 0.3 % (ref 0–0.5)
LYMPHOCYTES # BLD AUTO: 1.2 10*3/MM3 (ref 0.7–3.1)
LYMPHOCYTES NFR BLD AUTO: 15.3 % (ref 19.6–45.3)
MCH RBC QN AUTO: 27.9 PG (ref 26.6–33)
MCHC RBC AUTO-ENTMCNC: 32.5 G/DL (ref 31.5–35.7)
MCV RBC AUTO: 85.8 FL (ref 79–97)
MONOCYTES # BLD AUTO: 0.83 10*3/MM3 (ref 0.1–0.9)
MONOCYTES NFR BLD AUTO: 10.6 % (ref 5–12)
NEUTROPHILS NFR BLD AUTO: 5.71 10*3/MM3 (ref 1.7–7)
NEUTROPHILS NFR BLD AUTO: 73 % (ref 42.7–76)
NRBC BLD AUTO-RTO: 0 /100 WBC (ref 0–0.2)
PLATELET # BLD AUTO: 201 10*3/MM3 (ref 140–450)
PMV BLD AUTO: 9.1 FL (ref 6–12)
POTASSIUM SERPL-SCNC: 4 MMOL/L (ref 3.5–5.2)
PREALB SERPL-MCNC: 16 MG/DL (ref 20–40)
PROT SERPL-MCNC: 6.6 G/DL (ref 6–8.5)
RBC # BLD AUTO: 5.27 10*6/MM3 (ref 4.14–5.8)
SODIUM SERPL-SCNC: 142 MMOL/L (ref 136–145)
WBC NRBC COR # BLD: 7.82 10*3/MM3 (ref 3.4–10.8)

## 2022-10-27 PROCEDURE — G0463 HOSPITAL OUTPT CLINIC VISIT: HCPCS

## 2022-10-27 PROCEDURE — 87070 CULTURE OTHR SPECIMN AEROBIC: CPT | Performed by: NURSE PRACTITIONER

## 2022-10-27 PROCEDURE — 85652 RBC SED RATE AUTOMATED: CPT | Performed by: NURSE PRACTITIONER

## 2022-10-27 PROCEDURE — 85025 COMPLETE CBC W/AUTO DIFF WBC: CPT | Performed by: NURSE PRACTITIONER

## 2022-10-27 PROCEDURE — 87205 SMEAR GRAM STAIN: CPT | Performed by: NURSE PRACTITIONER

## 2022-10-27 PROCEDURE — 84134 ASSAY OF PREALBUMIN: CPT | Performed by: NURSE PRACTITIONER

## 2022-10-27 PROCEDURE — 86140 C-REACTIVE PROTEIN: CPT | Performed by: NURSE PRACTITIONER

## 2022-10-27 PROCEDURE — 80053 COMPREHEN METABOLIC PANEL: CPT | Performed by: NURSE PRACTITIONER

## 2022-10-27 RX ORDER — SODIUM HYPOCHLORITE 2.5 MG/ML
1 SOLUTION TOPICAL AS NEEDED
Status: CANCELLED | OUTPATIENT
Start: 2022-11-17

## 2022-10-27 RX ORDER — CASTOR OIL AND BALSAM, PERU 788; 87 MG/G; MG/G
1 OINTMENT TOPICAL AS NEEDED
Status: CANCELLED | OUTPATIENT
Start: 2022-11-17

## 2022-10-27 RX ORDER — SODIUM HYPOCHLORITE 1.25 MG/ML
1 SOLUTION TOPICAL AS NEEDED
Status: CANCELLED | OUTPATIENT
Start: 2022-11-17

## 2022-10-27 RX ORDER — LIDOCAINE HYDROCHLORIDE 20 MG/ML
JELLY TOPICAL AS NEEDED
Status: DISCONTINUED | OUTPATIENT
Start: 2022-10-27 | End: 2022-10-28 | Stop reason: HOSPADM

## 2022-10-27 RX ORDER — LIDOCAINE HYDROCHLORIDE 20 MG/ML
JELLY TOPICAL AS NEEDED
Status: CANCELLED
Start: 2022-11-17

## 2022-10-27 RX ORDER — CLINDAMYCIN HYDROCHLORIDE 300 MG/1
300 CAPSULE ORAL 3 TIMES DAILY
Qty: 21 CAPSULE | Refills: 0 | Status: SHIPPED | OUTPATIENT
Start: 2022-10-27 | End: 2022-11-03

## 2022-10-27 RX ADMIN — LIDOCAINE HYDROCHLORIDE 6 ML: 20 JELLY TOPICAL at 12:54

## 2022-10-29 LAB
BACTERIA SPEC AEROBE CULT: NORMAL
GRAM STN SPEC: NORMAL
GRAM STN SPEC: NORMAL

## 2022-11-03 ENCOUNTER — HOSPITAL ENCOUNTER (OUTPATIENT)
Dept: WOUND CARE | Facility: HOSPITAL | Age: 62
Discharge: HOME | End: 2022-11-03
Admitting: NURSE PRACTITIONER

## 2022-11-03 RX ORDER — LIDOCAINE HYDROCHLORIDE 20 MG/ML
JELLY TOPICAL AS NEEDED
Status: DISCONTINUED | OUTPATIENT
Start: 2022-11-03 | End: 2022-11-04 | Stop reason: HOSPADM

## 2022-11-03 RX ADMIN — LIDOCAINE HYDROCHLORIDE: 20 JELLY TOPICAL at 11:20

## 2022-11-10 ENCOUNTER — HOSPITAL ENCOUNTER (OUTPATIENT)
Dept: WOUND CARE | Facility: HOSPITAL | Age: 62
Discharge: HOME | End: 2022-11-10
Admitting: NURSE PRACTITIONER

## 2022-11-10 VITALS
TEMPERATURE: 98.3 F | DIASTOLIC BLOOD PRESSURE: 70 MMHG | RESPIRATION RATE: 18 BRPM | HEART RATE: 78 BPM | SYSTOLIC BLOOD PRESSURE: 130 MMHG

## 2022-11-10 RX ORDER — LIDOCAINE HYDROCHLORIDE 20 MG/ML
JELLY TOPICAL ONCE
Status: COMPLETED | OUTPATIENT
Start: 2022-11-10 | End: 2022-11-10

## 2022-11-10 RX ORDER — LANCETS 30 GAUGE
EACH MISCELLANEOUS
Qty: 100 EACH | Refills: 3 | Status: SHIPPED | OUTPATIENT
Start: 2022-11-10 | End: 2022-12-05 | Stop reason: SDUPTHER

## 2022-11-10 RX ADMIN — LIDOCAINE HYDROCHLORIDE 6 ML: 20 JELLY TOPICAL at 11:45

## 2022-11-14 NOTE — PROGRESS NOTES
Wound Clinic Note  Patient Identification:  Name:  Piotr Kc  Age:  61 y.o.  Sex:  male  :  1960  MRN:  [id number]   Visit Number:  [id number]  Primary Care Physician:  Kiel Kline MD     Subjective     Chief complaint:     Right lower leg wound and left lower leg wound     History of presenting illness:     Patient is a 61 y.o. male with past medical history significant for diabetes, and obesity. Presented today for evaluation ofbilateral lower extremity lacerations. Patient states he fell going down some steps and injured legs 10/24/2022. He went to ED For evaluation and workup on that day. He had sutures applied to both the right and left lower legs. He report he is up-to-date on his tetanus shot. He reports that his diabetes is fairly controlled. Denies smoking history. Denies any vascular disease history. Upon evaluation today, he has area of dehisce as well as swelling, erythema and warmth. He has not been prescribed antibiotics. Denies any fever or chills.      Interval History:  2022: Patient seen in clinic today for follow-up to right and left lower leg wounds. Sutures remain in place to the right lower leg. Bruising and swelling continue. Large area of undermining present now to the right lower leg. Left leg sutures with Reports moderate amount of drainage. Denies any fever or chills. Reports pain has improved. Labs reviewed: glucose 111, albumin 3.86, prealbumin 16.0, CRP 3.71, WBC 7.82, sed rate 20.  ---------------------------------------------------------------------------------------------------------------------   Review of Systems   Constitutional: Negative for chills and fever.   HENT: Negative for congestion and rhinorrhea.    Respiratory: Negative for shortness of breath.    Cardiovascular: Positive for leg swelling. Negative for chest pain.   Gastrointestinal: Negative for nausea and vomiting.   Musculoskeletal: Negative for back pain and gait problem.    Skin: Positive for wound.   Neurological: Negative for weakness.   Hematological: Does not bruise/bleed easily.   Psychiatric/Behavioral: Negative for confusion. The patient is not nervous/anxious.       ---------------------------------------------------------------------------------------------------------------------   Past Medical History:   Diagnosis Date   • Diabetes mellitus (HCC)    • Diabetes mellitus type I (HCC)    • Hypertension    • Insulin pump in place    • Obesity      Past Surgical History:   Procedure Laterality Date   • COLON SURGERY     • TONSILLECTOMY       Family History   Problem Relation Age of Onset   • Hypertension Mother    • Heart disease Mother    • Atrial fibrillation Mother    • Hypertension Father    • Diabetes Father    • Cancer Sister      Social History     Socioeconomic History   • Marital status:    Tobacco Use   • Smoking status: Never   • Smokeless tobacco: Never   Vaping Use   • Vaping Use: Never used   Substance and Sexual Activity   • Alcohol use: Never   • Drug use: Never   • Sexual activity: Defer     ---------------------------------------------------------------------------------------------------------------------   Allergies:  Keflex [cephalexin]  ---------------------------------------------------------------------------------------------------------------------  Objective     ---------------------------------------------------------------------------------------------------------------------   Vital Signs:     No data found.  There were no vitals filed for this visit.  There is no height or weight on file to calculate BMI.  Wt Readings from Last 3 Encounters:   10/24/22 (!) 156 kg (345 lb)   08/11/22 (!) 155 kg (341 lb)   02/10/22 (!) 158 kg (347 lb 9.6 oz)       ---------------------------------------------------------------------------------------------------------------------   Physical Exam  Constitutional: Vital sign were reviewed (temperature, pulse,  respiration, and blood pressure) and found to be within expected limits, general appearance was assessed and the patient was found to be in no distress and calm and comfortable appears  Skin: Temperature:normal turgor and temperatureColor: normal, no cyanosis, jaundice, pallor or bruising, Moisture: dry,Nails: thickened yellow toenails bed, Hair:thinning to lower extremities .  Physical Exam  Wound Assessment:  Location: Right, anterior, lower, leg  Dressing Appearance: dressingappearance: clean  Closure: sutures  Changes since last exam: no changes  Etiology and classification: laceration  Wound bed structures/characteristics: full-thickness (subcutaneous tissue is exposed in at least a portion of the wound), dry, necrotic, pink  Edges moist  Periwound characteristics: ecchymotic, edematous  Periwound Temperature: normal turgor and temperature  Drainage characteristics: moderate, serous   Perfusion characteristics: suggest some degree of PAD  Left lower leg- sutures in place- area had dehisce present. Base with yellow slough. There is increase in swelling.   Wound Goal (s):Closure, Epithelization, No further symptoms and Reduction of contamination  Assessment & Plan      Laceration of left lower leg without foreign body [S81.812A]  -Keep area clean and dry  -Debridement completed, see below for procedure   -Honeygel to base and secure with kerlix and ACE    Laceration of right lower leg [S81.811A]  -Debridement completed, see below for procedure details   -Hydrogel to base and secure with kerlix and ACE  -Labs reviewed 11/03/2022: glucose 111, albumin 3.86, prealbumin 16.0, CRP 3.71, WBC 7.82, sed rate 20.  -Recommend dipti protein diet 120g/day along with vitamin C 2000mg/day, vitamin A 5000 Units/day, vitamin D3 5000 Units/day, zinc 50mg/day to help promote wound healing     Type 1 diabetes mellitus   -Recommend adequate glycemic control to help promote wound healing   -Poor glycemic control increases risk of  infection, loss of limb and premature death  -Primary care provider managing     Obesity [E66.9]  -An obese person?is at greater risk for wound infection and dehiscence or evisceration.    Wound Care Procedure Note   Pre-Procedure  Pre-Procedure Diagnosis: Laceration of right lower leg [S81.811A] with fat layer exposed, left lower leg with fat layer exposed  Checked for Allergies: yes  Consent:Consent obtained, consent given by Patient,Risks Discussed, Alternatives Discussed  Indication: bioburden  Vascular status:Pedal pulses right were found to be adequate and safe for debridment.  Time out was called prior to procedure.   Pre procedure Pain assessment: mild  Pre debridement measurements: Right lower leg Length 8.4cm,Width 11.5cm, depth 0.7cm, Left lower leg: Length 2.5cm,Width 0.3cm, depth 0.1cm sinus/tunnelNo, undermining No    Post Procedure  Post-Procedure Diagnosis: Laceration of right lower leg [S81.811A] with fat layer exposed, left lower leg with fat layer exposed  Post debridement measurements: Right lower leg Length 8.5cm,Width 11.6cm, depth 0.8cm, Left lower leg: Length 2.6cm,Width 0.4cm, depth 0.2cm sinus/tunnelNo, undermining No  Post procedure Pain assessment: mild  Graft/Implant/Prosthetics/Implanted Device/Transplants:  None  Complication(s):  None    Procedure details:  Method of Debridement: excissional (Surgical removal or cutting away, outside or beyond the wound margin devitalized tissue, necrosis or slough.)  Procedure: The site was prepared using clean techniques, subcutaneous tissue was removed by surgical excision.  Instrument(s) used: Curette 4mm  Anesthesia:After checking patient allergies,lidocaine topical 2% was administered to provide anesthesia.  Tissue removed: subuctaneous, Percent Removed 100%  Culture or Biopsy: culture and sensitivity  Estimated Blood Loss: Small  Hemostasis Obtained: pressure    Clinical Impression:Moderate Complexity    Follow-up: 1 week    MIGUEL ANGEL Rhodes    [facility]  11/03/2022  104

## 2022-11-17 ENCOUNTER — HOSPITAL ENCOUNTER (OUTPATIENT)
Dept: WOUND CARE | Facility: HOSPITAL | Age: 62
Discharge: HOME | End: 2022-11-17
Admitting: NURSE PRACTITIONER

## 2022-11-17 VITALS
TEMPERATURE: 98.3 F | RESPIRATION RATE: 17 BRPM | SYSTOLIC BLOOD PRESSURE: 131 MMHG | HEART RATE: 72 BPM | DIASTOLIC BLOOD PRESSURE: 72 MMHG

## 2022-11-17 RX ORDER — LIDOCAINE HYDROCHLORIDE 20 MG/ML
JELLY TOPICAL ONCE
Status: COMPLETED | OUTPATIENT
Start: 2022-11-17 | End: 2022-11-17

## 2022-11-17 RX ADMIN — LIDOCAINE HYDROCHLORIDE: 20 JELLY TOPICAL at 11:11

## 2022-11-17 NOTE — PROGRESS NOTES
Wound Clinic Note  Patient Identification:  Name:  Piotr Kc  Age:  61 y.o.  Sex:  male  :  1960  MRN:  [id number]   Visit Number:  [id number]  Primary Care Physician:  Kiel Kline MD     Subjective     Chief complaint:     Right lower leg wound and left lower leg wound     History of presenting illness:     Patient is a 61 y.o. male with past medical history significant for diabetes, and obesity. Presented today for evaluation ofbilateral lower extremity lacerations. Patient states he fell going down some steps and injured legs 10/24/2022. He went to ED For evaluation and workup on that day. He had sutures applied to both the right and left lower legs. He report he is up-to-date on his tetanus shot. He reports that his diabetes is fairly controlled. Denies smoking history. Denies any vascular disease history. Upon evaluation today, he has area of dehisce as well as swelling, erythema and warmth. He has not been prescribed antibiotics. Denies any fever or chills.      Interval History:  2022: Patient seen in clinic today for follow-up to right and left lower leg wounds. Sutures remain in place to the right lower leg. Bruising and swelling continue. Large area of undermining present now to the right lower leg. Left leg sutures with Reports moderate amount of drainage. Denies any fever or chills. Reports pain has improved. Labs reviewed: glucose 111, albumin 3.86, prealbumin 16.0, CRP 3.71, WBC 7.82, sed rate 20.    11/10/2022: Patient seen in clinic today for follow-up to right and left lower leg wounds. Wound to the right lower leg continues to be severe. Areas of black eschar is present. Swelling is unchanged. Left lower leg with yellow slough. Denies any fever or chills. Reports tolerance to current treatment without complications.   ---------------------------------------------------------------------------------------------------------------------   Review of Systems    Constitutional: Negative for chills and fever.   HENT: Negative for congestion and rhinorrhea.    Respiratory: Negative for shortness of breath.    Cardiovascular: Positive for leg swelling. Negative for chest pain.   Gastrointestinal: Negative for nausea and vomiting.   Musculoskeletal: Negative for back pain and gait problem.   Skin: Positive for wound.   Neurological: Negative for weakness.   Hematological: Does not bruise/bleed easily.   Psychiatric/Behavioral: Negative for confusion. The patient is not nervous/anxious.       ---------------------------------------------------------------------------------------------------------------------   Past Medical History:   Diagnosis Date   • Diabetes mellitus (HCC)    • Diabetes mellitus type I (HCC)    • Hypertension    • Insulin pump in place    • Obesity      Past Surgical History:   Procedure Laterality Date   • COLON SURGERY     • TONSILLECTOMY       Family History   Problem Relation Age of Onset   • Hypertension Mother    • Heart disease Mother    • Atrial fibrillation Mother    • Hypertension Father    • Diabetes Father    • Cancer Sister      Social History     Socioeconomic History   • Marital status:    Tobacco Use   • Smoking status: Never   • Smokeless tobacco: Never   Vaping Use   • Vaping Use: Never used   Substance and Sexual Activity   • Alcohol use: Never   • Drug use: Never   • Sexual activity: Defer     ---------------------------------------------------------------------------------------------------------------------   Allergies:  Keflex [cephalexin]  ---------------------------------------------------------------------------------------------------------------------  Objective     ---------------------------------------------------------------------------------------------------------------------   Vital Signs:     No data found.  There were no vitals filed for this visit.  There is no height or weight on file to calculate BMI.  Wt  Readings from Last 3 Encounters:   10/24/22 (!) 156 kg (345 lb)   08/11/22 (!) 155 kg (341 lb)   02/10/22 (!) 158 kg (347 lb 9.6 oz)       ---------------------------------------------------------------------------------------------------------------------   Physical Exam  Constitutional: Vital sign were reviewed (temperature, pulse, respiration, and blood pressure) and found to be within expected limits, general appearance was assessed and the patient was found to be in no distress and calm and comfortable appears  Skin: Temperature:normal turgor and temperatureColor: normal, no cyanosis, jaundice, pallor or bruising, Moisture: dry,Nails: thickened yellow toenails bed, Hair:thinning to lower extremities .  Physical Exam  Wound Assessment:  Location: Right, anterior, lower, leg  Dressing Appearance: dressingappearance: clean  Closure: sutures  Changes since last exam: no changes  Etiology and classification: laceration  Wound bed structures/characteristics: full-thickness (subcutaneous tissue is exposed in at least a portion of the wound), dry, necrotic, pink  Edges moist  Periwound characteristics: ecchymotic, edematous  Periwound Temperature: normal turgor and temperature  Drainage characteristics: moderate, serous   Perfusion characteristics: suggest some degree of PAD  Left lower leg- sutures in place- area had dehisce present. Base with yellow slough. There is increase in swelling.   Wound Goal (s):Closure, Epithelization, No further symptoms and Reduction of contamination  Assessment & Plan      Laceration of left lower leg without foreign body [S81.812A]  -Keep area clean and dry  -Honeygel to base and secure with kerlix and ACE    Laceration of right lower leg [S81.811A]  -Debridement completed, see below for procedure details   -Hydrogel to base and secure with kerlix and ACE  -Labs reviewed 11/03/2022: glucose 111, albumin 3.86, prealbumin 16.0, CRP 3.71, WBC 7.82, sed rate 20.  -Recommend dipti protein  diet 120g/day along with vitamin C 2000mg/day, vitamin A 5000 Units/day, vitamin D3 5000 Units/day, zinc 50mg/day to help promote wound healing     Type 1 diabetes mellitus   -Recommend adequate glycemic control to help promote wound healing   -Poor glycemic control increases risk of infection, loss of limb and premature death  -Primary care provider managing     Obesity [E66.9]  -An obese person?is at greater risk for wound infection and dehiscence or evisceration.    Wound Care Procedure Note   Pre-Procedure  Pre-Procedure Diagnosis: Laceration of right lower leg [S81.811A] with fat layer exposed, left lower leg with fat layer exposed  Checked for Allergies: yes  Consent:Consent obtained, consent given by Patient,Risks Discussed, Alternatives Discussed  Indication: bioburden  Vascular status:Pedal pulses right were found to be adequate and safe for debridment.  Time out was called prior to procedure.   Pre procedure Pain assessment: mild  Pre debridement measurements: Right lower leg Length 10cm,Width 15cm, depth 0.5cm, sinus/tunnelNo, undermining No    Post Procedure  Post-Procedure Diagnosis: Laceration of right lower leg [S81.811A] with fat layer exposed, left lower leg with fat layer exposed  Post debridement measurements: Right lower leg Length 10.1cm,Width 15.1cm, depth 0.6cm, sinus/tunnelNo, undermining No  Post procedure Pain assessment: mild  Graft/Implant/Prosthetics/Implanted Device/Transplants:  None  Complication(s):  None    Procedure details:  Method of Debridement: excissional (Surgical removal or cutting away, outside or beyond the wound margin devitalized tissue, necrosis or slough.)  Procedure: The site was prepared using clean techniques, subcutaneous tissue was removed by surgical excision.  Instrument(s) used: Curette 4mm  Anesthesia:After checking patient allergies,lidocaine topical 2% was administered to provide anesthesia.  Tissue removed: subuctaneous, Percent Removed 100%  Culture or  Biopsy: culture and sensitivity  Estimated Blood Loss: Small  Hemostasis Obtained: pressure    Clinical Impression:Moderate Complexity    Follow-up: 1 week    MIGUEL ANGEL Rhodes   [facility]  11/10/2022  [id number]

## 2022-11-23 ENCOUNTER — HOSPITAL ENCOUNTER (OUTPATIENT)
Dept: WOUND CARE | Facility: HOSPITAL | Age: 62
Discharge: HOME | End: 2022-11-23
Admitting: NURSE PRACTITIONER

## 2022-11-23 VITALS
SYSTOLIC BLOOD PRESSURE: 144 MMHG | RESPIRATION RATE: 18 BRPM | HEART RATE: 78 BPM | DIASTOLIC BLOOD PRESSURE: 70 MMHG | TEMPERATURE: 98.3 F

## 2022-11-23 DIAGNOSIS — S81.812S: Primary | ICD-10-CM

## 2022-11-23 LAB
ALBUMIN SERPL-MCNC: 3.41 G/DL (ref 3.5–5.2)
ALBUMIN/GLOB SERPL: 1 G/DL
ALP SERPL-CCNC: 80 U/L (ref 39–117)
ALT SERPL W P-5'-P-CCNC: 16 U/L (ref 1–41)
ANION GAP SERPL CALCULATED.3IONS-SCNC: 13.7 MMOL/L (ref 5–15)
AST SERPL-CCNC: 18 U/L (ref 1–40)
BASOPHILS # BLD AUTO: 0.04 10*3/MM3 (ref 0–0.2)
BASOPHILS NFR BLD AUTO: 0.6 % (ref 0–1.5)
BILIRUB SERPL-MCNC: 0.4 MG/DL (ref 0–1.2)
BUN SERPL-MCNC: 16 MG/DL (ref 8–23)
BUN/CREAT SERPL: 17.4 (ref 7–25)
CALCIUM SPEC-SCNC: 9.3 MG/DL (ref 8.6–10.5)
CHLORIDE SERPL-SCNC: 106 MMOL/L (ref 98–107)
CO2 SERPL-SCNC: 20.3 MMOL/L (ref 22–29)
CREAT SERPL-MCNC: 0.92 MG/DL (ref 0.76–1.27)
CRP SERPL-MCNC: <0.3 MG/DL (ref 0–0.5)
DEPRECATED RDW RBC AUTO: 44.6 FL (ref 37–54)
EGFRCR SERPLBLD CKD-EPI 2021: 95.2 ML/MIN/1.73
EOSINOPHIL # BLD AUTO: 0.05 10*3/MM3 (ref 0–0.4)
EOSINOPHIL NFR BLD AUTO: 0.8 % (ref 0.3–6.2)
ERYTHROCYTE [DISTWIDTH] IN BLOOD BY AUTOMATED COUNT: 14.7 % (ref 12.3–15.4)
ERYTHROCYTE [SEDIMENTATION RATE] IN BLOOD: 15 MM/HR (ref 0–20)
GLOBULIN UR ELPH-MCNC: 3.5 GM/DL
GLUCOSE SERPL-MCNC: 107 MG/DL (ref 65–99)
HBA1C MFR BLD: 7.1 % (ref 4.8–5.6)
HCT VFR BLD AUTO: 43.9 % (ref 37.5–51)
HGB BLD-MCNC: 14.1 G/DL (ref 13–17.7)
IMM GRANULOCYTES # BLD AUTO: 0.02 10*3/MM3 (ref 0–0.05)
IMM GRANULOCYTES NFR BLD AUTO: 0.3 % (ref 0–0.5)
LYMPHOCYTES # BLD AUTO: 1.43 10*3/MM3 (ref 0.7–3.1)
LYMPHOCYTES NFR BLD AUTO: 23.1 % (ref 19.6–45.3)
MCH RBC QN AUTO: 27 PG (ref 26.6–33)
MCHC RBC AUTO-ENTMCNC: 32.1 G/DL (ref 31.5–35.7)
MCV RBC AUTO: 83.9 FL (ref 79–97)
MONOCYTES # BLD AUTO: 0.49 10*3/MM3 (ref 0.1–0.9)
MONOCYTES NFR BLD AUTO: 7.9 % (ref 5–12)
NEUTROPHILS NFR BLD AUTO: 4.17 10*3/MM3 (ref 1.7–7)
NEUTROPHILS NFR BLD AUTO: 67.3 % (ref 42.7–76)
NRBC BLD AUTO-RTO: 0 /100 WBC (ref 0–0.2)
PLATELET # BLD AUTO: 271 10*3/MM3 (ref 140–450)
PMV BLD AUTO: 9.3 FL (ref 6–12)
POTASSIUM SERPL-SCNC: 5 MMOL/L (ref 3.5–5.2)
PREALB SERPL-MCNC: 20.9 MG/DL (ref 20–40)
PROT SERPL-MCNC: 6.9 G/DL (ref 6–8.5)
RBC # BLD AUTO: 5.23 10*6/MM3 (ref 4.14–5.8)
SODIUM SERPL-SCNC: 140 MMOL/L (ref 136–145)
WBC NRBC COR # BLD: 6.2 10*3/MM3 (ref 3.4–10.8)

## 2022-11-23 PROCEDURE — 83036 HEMOGLOBIN GLYCOSYLATED A1C: CPT | Performed by: NURSE PRACTITIONER

## 2022-11-23 PROCEDURE — 86140 C-REACTIVE PROTEIN: CPT | Performed by: NURSE PRACTITIONER

## 2022-11-23 PROCEDURE — 80053 COMPREHEN METABOLIC PANEL: CPT | Performed by: NURSE PRACTITIONER

## 2022-11-23 PROCEDURE — 85652 RBC SED RATE AUTOMATED: CPT | Performed by: NURSE PRACTITIONER

## 2022-11-23 PROCEDURE — 97602 WOUND(S) CARE NON-SELECTIVE: CPT

## 2022-11-23 PROCEDURE — 84134 ASSAY OF PREALBUMIN: CPT | Performed by: NURSE PRACTITIONER

## 2022-11-23 PROCEDURE — 85025 COMPLETE CBC W/AUTO DIFF WBC: CPT | Performed by: NURSE PRACTITIONER

## 2022-11-23 RX ORDER — LIDOCAINE HYDROCHLORIDE 20 MG/ML
JELLY TOPICAL ONCE
Status: COMPLETED | OUTPATIENT
Start: 2022-11-23 | End: 2022-11-23

## 2022-11-23 RX ADMIN — LIDOCAINE HYDROCHLORIDE: 20 JELLY TOPICAL at 10:34

## 2022-11-23 NOTE — PROGRESS NOTES
Wound Clinic Note  Patient Identification:  Name:  Piotr Kc  Age:  61 y.o.  Sex:  male  :  1960  MRN:  [id number]   Visit Number:  [id number]  Primary Care Physician:  Kiel Kline MD     Subjective     Chief complaint:     Right lower leg wound and left lower leg wound     History of presenting illness:     Patient is a 61 y.o. male with past medical history significant for diabetes, and obesity. Presented today for evaluation ofbilateral lower extremity lacerations. Patient states he fell going down some steps and injured legs 10/24/2022. He went to ED For evaluation and workup on that day. He had sutures applied to both the right and left lower legs. He report he is up-to-date on his tetanus shot. He reports that his diabetes is fairly controlled. Denies smoking history. Denies any vascular disease history. Upon evaluation today, he has area of dehisce as well as swelling, erythema and warmth. He has not been prescribed antibiotics. Denies any fever or chills.      Interval History:  2022: Patient seen in clinic today for follow-up to right and left lower leg wounds. Sutures remain in place to the right lower leg. Bruising and swelling continue. Large area of undermining present now to the right lower leg. Left leg sutures with Reports moderate amount of drainage. Denies any fever or chills. Reports pain has improved. Labs reviewed: glucose 111, albumin 3.86, prealbumin 16.0, CRP 3.71, WBC 7.82, sed rate 20.    11/10/2022: Patient seen in clinic today for follow-up to right and left lower leg wounds. Wound to the right lower leg continues to be severe. Areas of black eschar is present. Swelling is unchanged. Left lower leg with yellow slough. Denies any fever or chills. Reports tolerance to current treatment without complications.     2022: patient seen in clinic today for follow-up to right and left lower leg wounds. Wound continues with area of soft black eschar. There  is small area of tunneling. Swelling has decreased. Denies any fever or chills. Pain moderate. No complications from current treatment. Reports glucose levels continue to be controlled.   ---------------------------------------------------------------------------------------------------------------------   Review of Systems   Constitutional: Negative for chills and fever.   HENT: Negative for congestion and rhinorrhea.    Respiratory: Negative for shortness of breath.    Cardiovascular: Positive for leg swelling. Negative for chest pain.   Gastrointestinal: Negative for nausea and vomiting.   Musculoskeletal: Negative for back pain and gait problem.   Skin: Positive for wound.   Neurological: Negative for weakness.   Hematological: Does not bruise/bleed easily.   Psychiatric/Behavioral: Negative for confusion. The patient is not nervous/anxious.       ---------------------------------------------------------------------------------------------------------------------   Past Medical History:   Diagnosis Date   • Diabetes mellitus (HCC)    • Diabetes mellitus type I (HCC)    • Hypertension    • Insulin pump in place    • Obesity      Past Surgical History:   Procedure Laterality Date   • COLON SURGERY     • TONSILLECTOMY       Family History   Problem Relation Age of Onset   • Hypertension Mother    • Heart disease Mother    • Atrial fibrillation Mother    • Hypertension Father    • Diabetes Father    • Cancer Sister      Social History     Socioeconomic History   • Marital status:    Tobacco Use   • Smoking status: Never   • Smokeless tobacco: Never   Vaping Use   • Vaping Use: Never used   Substance and Sexual Activity   • Alcohol use: Never   • Drug use: Never   • Sexual activity: Defer     ---------------------------------------------------------------------------------------------------------------------   Allergies:  Keflex  [cephalexin]  ---------------------------------------------------------------------------------------------------------------------  Objective     ---------------------------------------------------------------------------------------------------------------------   Vital Signs:     No data found.  There were no vitals filed for this visit.  There is no height or weight on file to calculate BMI.  Wt Readings from Last 3 Encounters:   10/24/22 (!) 156 kg (345 lb)   08/11/22 (!) 155 kg (341 lb)   02/10/22 (!) 158 kg (347 lb 9.6 oz)       ---------------------------------------------------------------------------------------------------------------------   Physical Exam  Constitutional: Vital sign were reviewed (temperature, pulse, respiration, and blood pressure) and found to be within expected limits, general appearance was assessed and the patient was found to be in no distress and calm and comfortable appears  Skin: Temperature:normal turgor and temperatureColor: normal, no cyanosis, jaundice, pallor or bruising, Moisture: dry,Nails: thickened yellow toenails bed, Hair:thinning to lower extremities .  Physical Exam  Wound Assessment:  Location: Right, anterior, lower, leg  Dressing Appearance: dressingappearance: clean  Closure: sutures  Changes since last exam: bioburden coverage has diminished   Etiology and classification: laceration  Wound bed structures/characteristics: full-thickness (subcutaneous tissue is exposed in at least a portion of the wound), dry, necrotic, pink  Edges moist  Periwound characteristics: ecchymotic, edematous  Periwound Temperature: normal turgor and temperature  Drainage characteristics: moderate, serous   Perfusion characteristics: suggest some degree of PAD  Left lower leg- sutures in place- area had dehisce present. Base with yellow slough. There is increase in swelling.   Wound Goal (s):Closure, Epithelization, No further symptoms and Reduction of contamination  Assessment & Plan       Laceration of left lower leg without foreign body [S81.812A]  -Keep area clean and dry  -Honeygel to base and secure with kerlix and ACE    Laceration of right lower leg [S81.811A]  -Debridement completed, see below for procedure details   -Hydrogel to base and secure with kerlix and ACE  -Labs reviewed 11/03/2022: glucose 111, albumin 3.86, prealbumin 16.0, CRP 3.71, WBC 7.82, sed rate 20.  -Recommend dipti protein diet 120g/day along with vitamin C 2000mg/day, vitamin A 5000 Units/day, vitamin D3 5000 Units/day, zinc 50mg/day to help promote wound healing     Type 1 diabetes mellitus   -Recommend adequate glycemic control to help promote wound healing   -Poor glycemic control increases risk of infection, loss of limb and premature death  -Primary care provider managing     Obesity [E66.9]  -An obese person?is at greater risk for wound infection and dehiscence or evisceration.    Wound Care Procedure Note   Pre-Procedure  Pre-Procedure Diagnosis: Laceration of right lower leg [S81.811A] with fat layer exposed, left lower leg with fat layer exposed  Checked for Allergies: yes  Consent:Consent obtained, consent given by Patient,Risks Discussed, Alternatives Discussed  Indication: bioburden  Vascular status:Pedal pulses right were found to be adequate and safe for debridment.  Time out was called prior to procedure.   Pre procedure Pain assessment: mild  Pre debridement measurements: Right lower leg Length 9.8cm,Width 12.5cm, depth 0.4cm, sinus/tunnelNo, undermining No    Post Procedure  Post-Procedure Diagnosis: Laceration of right lower leg [S81.811A] with fat layer exposed, left lower leg with fat layer exposed  Post debridement measurements: Right lower leg Length 9.9cm,Width 12.6cm, depth 0.5cm, sinus/tunnelNo, undermining No  Post procedure Pain assessment: mild  Graft/Implant/Prosthetics/Implanted Device/Transplants:  None  Complication(s):  None    Procedure details:  Method of Debridement: excissional  (Surgical removal or cutting away, outside or beyond the wound margin devitalized tissue, necrosis or slough.)  Procedure: The site was prepared using clean techniques, subcutaneous tissue was removed by surgical excision.  Instrument(s) used: Curette 4mm  Anesthesia:After checking patient allergies,lidocaine topical 2% was administered to provide anesthesia.  Tissue removed: subuctaneous, Percent Removed 100%  Culture or Biopsy: culture and sensitivity  Estimated Blood Loss: Small  Hemostasis Obtained: pressure    Clinical Impression:Moderate Complexity    Follow-up: 1 week    MIGUEL ANGEL Rhodes   [facility]  11/17/2022  [id number]

## 2022-11-23 NOTE — PROGRESS NOTES
Wound Clinic Note  Patient Identification:  Name:  Piotr Kc  Age:  61 y.o.  Sex:  male  :  1960  MRN:  [id number]   Visit Number:  [id number]  Primary Care Physician:  Kiel Kline MD     Subjective     Chief complaint:     Right lower leg wound and left lower leg wound     History of presenting illness:     Patient is a 61 y.o. male with past medical history significant for diabetes, and obesity. Presented today for evaluation ofbilateral lower extremity lacerations. Patient states he fell going down some steps and injured legs 10/24/2022. He went to ED For evaluation and workup on that day. He had sutures applied to both the right and left lower legs. He report he is up-to-date on his tetanus shot. He reports that his diabetes is fairly controlled. Denies smoking history. Denies any vascular disease history. Upon evaluation today, he has area of dehisce as well as swelling, erythema and warmth. He has not been prescribed antibiotics. Denies any fever or chills.      Interval History:  2022: Patient seen in clinic today for follow-up to right and left lower leg wounds. Sutures remain in place to the right lower leg. Bruising and swelling continue. Large area of undermining present now to the right lower leg. Left leg sutures with Reports moderate amount of drainage. Denies any fever or chills. Reports pain has improved. Labs reviewed: glucose 111, albumin 3.86, prealbumin 16.0, CRP 3.71, WBC 7.82, sed rate 20.    11/10/2022: Patient seen in clinic today for follow-up to right and left lower leg wounds. Wound to the right lower leg continues to be severe. Areas of black eschar is present. Swelling is unchanged. Left lower leg with yellow slough. Denies any fever or chills. Reports tolerance to current treatment without complications.     2022: patient seen in clinic today for follow-up to right and left lower leg wounds. Wound continues with area of soft black eschar. There  is small area of tunneling. Swelling has decreased. Denies any fever or chills. Pain moderate. No complications from current treatment. Reports glucose levels continue to be controlled.     11/23/2022: Patient seen in clinic today for follow-up to right and left lower leg wounds. Right leg wound continues to be severe. There is less evidence of devitalized tissue. Swelling is stable. Reports tolerance to current  Treatment, no reported complications. Denies any fever or chills. Unable to complete vascular workup at this time due to pain and inability to tolerate AGUSTIN. Will attempt Arterial US.   ---------------------------------------------------------------------------------------------------------------------   Review of Systems   Constitutional: Negative for chills and fever.   HENT: Negative for congestion and rhinorrhea.    Respiratory: Negative for shortness of breath.    Cardiovascular: Positive for leg swelling. Negative for chest pain.   Gastrointestinal: Negative for nausea and vomiting.   Musculoskeletal: Negative for back pain and gait problem.   Skin: Positive for wound.   Neurological: Negative for weakness.   Hematological: Does not bruise/bleed easily.   Psychiatric/Behavioral: Negative for confusion. The patient is not nervous/anxious.       ---------------------------------------------------------------------------------------------------------------------   Past Medical History:   Diagnosis Date   • Diabetes mellitus (HCC)    • Diabetes mellitus type I (HCC)    • Hypertension    • Insulin pump in place    • Obesity      Past Surgical History:   Procedure Laterality Date   • COLON SURGERY     • TONSILLECTOMY       Family History   Problem Relation Age of Onset   • Hypertension Mother    • Heart disease Mother    • Atrial fibrillation Mother    • Hypertension Father    • Diabetes Father    • Cancer Sister      Social History     Socioeconomic History   • Marital status:    Tobacco Use   •  Smoking status: Never   • Smokeless tobacco: Never   Vaping Use   • Vaping Use: Never used   Substance and Sexual Activity   • Alcohol use: Never   • Drug use: Never   • Sexual activity: Defer     ---------------------------------------------------------------------------------------------------------------------   Allergies:  Keflex [cephalexin]  ---------------------------------------------------------------------------------------------------------------------  Objective     ---------------------------------------------------------------------------------------------------------------------   Vital Signs:  Temp:  [98.3 °F (36.8 °C)] 98.3 °F (36.8 °C)  Heart Rate:  [78] 78  Resp:  [18] 18  BP: (144)/(70) 144/70  No data found.  There were no vitals filed for this visit.  There is no height or weight on file to calculate BMI.  Wt Readings from Last 3 Encounters:   10/24/22 (!) 156 kg (345 lb)   08/11/22 (!) 155 kg (341 lb)   02/10/22 (!) 158 kg (347 lb 9.6 oz)       ---------------------------------------------------------------------------------------------------------------------   Physical Exam  Constitutional: Vital sign were reviewed (temperature, pulse, respiration, and blood pressure) and found to be within expected limits, general appearance was assessed and the patient was found to be in no distress and calm and comfortable appears  Skin: Temperature:normal turgor and temperatureColor: normal, no cyanosis, jaundice, pallor or bruising, Moisture: dry,Nails: thickened yellow toenails bed, Hair:thinning to lower extremities .  Physical Exam  Wound Assessment:  Location: Right, anterior, lower, leg  Dressing Appearance: dressingappearance: clean  Closure: sutures  Changes since last exam: bioburden coverage has diminished   Etiology and classification: laceration  Wound bed structures/characteristics: full-thickness (subcutaneous tissue is exposed in at least a portion of the wound), moist, pink  Edges  moist  Periwound characteristics: ecchymotic, edematous  Periwound Temperature: normal turgor and temperature  Drainage characteristics: moderate, serous   Perfusion characteristics: suggest some degree of PAD  Left lower leg- sutures in place- base dry scabbing. No evidence of erythema   Wound Goal (s):Closure, Epithelization, No further symptoms and Reduction of contamination  Assessment & Plan      Laceration of left lower leg without foreign body [S81.812A]  -Keep area clean and dry  -Honeygel to base and secure with kerlix and ACE    Laceration of right lower leg [S81.811A]  -Honeygel to base and secure with kerlix and ACE  -Labs ordered: CBC, CMP, CRP, sed rate, prealbumin, and hemoglobain A1C to assess inflammatory markers and nutritional status as this both and negatively impact wound healing if not properly treated.   -Labs reviewed 11/03/2022: glucose 111, albumin 3.86, prealbumin 16.0, CRP 3.71, WBC 7.82, sed rate 20.  -Recommend dipti protein diet 120g/day along with vitamin C 2000mg/day, vitamin A 5000 Units/day, vitamin D3 5000 Units/day, zinc 50mg/day to help promote wound healing   -Arterial US ordered to assess for any underlying vascular concerns.     Type 1 diabetes mellitus   -Recommend adequate glycemic control to help promote wound healing   -Poor glycemic control increases risk of infection, loss of limb and premature death  -Primary care provider managing     Obesity [E66.9]  -An obese person?is at greater risk for wound infection and dehiscence or evisceration.    EVALUATION:  The application of skin substitute has been considered for this non-healing ulcer located on Laceration of right lower leg [S81.811A]. The ulcer has failed to show evidence of healing by adequate advancement of the epithelial margin after 30 days of comprehensive management and standard conservative wound care. The preservice record has been thoroughly documented and reviewed to ensure the circumstances resulting in  wound failure were addressed appropriately and with the required interventions. The patient has been compliant with all recommendations of their comprehensive management to include control of underlying conditions that contributed to the ulcer, appropriate off-loading, and an optimized nutritional plan. Nutritional assessment and planning is based on labs of ALB  3.41, PREALB 16.0.  Vascular assessment is pending, arterial US has been ordered.   Patient's HbA1c is 7.4 and is being managed by primary care provider. There is no evidence of underlying osteomyelitis or cellulitis, and any potential nidus of infection or bacterial colonization has been controlled. The ulcer is clean and free of necrotic debris and exudate with a granular base. Ulcer is full (or partial) skin loss , not involving tendon, muscle, joint capsule, or exhibiting bone or sinus tracts with a clean granular bed. At this time, placement of a cellular and/or tissue-based therapy is recommended, because its dermal matrix should provide the ideal environment to support cellular repopulation. The patient is a non-smoker or has refrained from systemic tobacco intake for at least 4 weeks during conservative wound care and prior to this procedure. Appropriate compression bandaging has been provided with diligent use of multilayer bandages, stockings (>20 mmHg) or velcro devices The addition of Skin Substitutes (PuraPlyAm, Apligraf) is reasonable and necessary for the treatment of this non healing ulceration, and may afford a healing advantage over dressings and conservative treatments that have proven insufficient to complete heal this patient.     Clinical Impression:Moderate Complexity    Follow-up: 1 week    MIGUEL ANGEL Rhodes   [facility]  11/23/2022  1015

## 2022-11-30 ENCOUNTER — HOSPITAL ENCOUNTER (OUTPATIENT)
Dept: CARDIOLOGY | Facility: HOSPITAL | Age: 62
Discharge: HOME | End: 2022-11-30

## 2022-11-30 ENCOUNTER — HOSPITAL ENCOUNTER (OUTPATIENT)
Dept: WOUND CARE | Facility: HOSPITAL | Age: 62
Discharge: HOME | End: 2022-11-30

## 2022-11-30 VITALS
DIASTOLIC BLOOD PRESSURE: 80 MMHG | RESPIRATION RATE: 17 BRPM | HEART RATE: 78 BPM | TEMPERATURE: 98 F | SYSTOLIC BLOOD PRESSURE: 148 MMHG

## 2022-11-30 DIAGNOSIS — S81.812S: ICD-10-CM

## 2022-11-30 PROCEDURE — 93925 LOWER EXTREMITY STUDY: CPT | Performed by: RADIOLOGY

## 2022-11-30 PROCEDURE — 97602 WOUND(S) CARE NON-SELECTIVE: CPT

## 2022-11-30 PROCEDURE — 93925 LOWER EXTREMITY STUDY: CPT

## 2022-11-30 RX ORDER — LIDOCAINE HYDROCHLORIDE 20 MG/ML
JELLY TOPICAL ONCE
Status: COMPLETED | OUTPATIENT
Start: 2022-11-30 | End: 2022-11-30

## 2022-11-30 RX ADMIN — LIDOCAINE HYDROCHLORIDE: 20 JELLY TOPICAL at 11:17

## 2022-12-01 ENCOUNTER — APPOINTMENT (OUTPATIENT)
Dept: WOUND CARE | Facility: HOSPITAL | Age: 62
End: 2022-12-01

## 2022-12-06 RX ORDER — LANCETS 30 GAUGE
EACH MISCELLANEOUS
Qty: 100 EACH | Refills: 3 | Status: SHIPPED | OUTPATIENT
Start: 2022-12-06

## 2022-12-08 ENCOUNTER — HOSPITAL ENCOUNTER (OUTPATIENT)
Dept: WOUND CARE | Facility: HOSPITAL | Age: 62
Discharge: HOME | End: 2022-12-08
Admitting: NURSE PRACTITIONER

## 2022-12-08 DIAGNOSIS — T14.8XXA WOUND, OPEN: Primary | ICD-10-CM

## 2022-12-08 RX ORDER — CASTOR OIL AND BALSAM, PERU 788; 87 MG/G; MG/G
1 OINTMENT TOPICAL AS NEEDED
Status: CANCELLED | OUTPATIENT
Start: 2022-12-08

## 2022-12-08 RX ORDER — LIDOCAINE HYDROCHLORIDE 20 MG/ML
JELLY TOPICAL AS NEEDED
Status: DISCONTINUED | OUTPATIENT
Start: 2022-12-08 | End: 2022-12-09 | Stop reason: HOSPADM

## 2022-12-08 RX ORDER — SODIUM HYPOCHLORITE 2.5 MG/ML
1 SOLUTION TOPICAL AS NEEDED
Status: CANCELLED | OUTPATIENT
Start: 2022-12-08

## 2022-12-08 RX ORDER — LIDOCAINE HYDROCHLORIDE 20 MG/ML
JELLY TOPICAL AS NEEDED
Status: CANCELLED
Start: 2022-12-08

## 2022-12-08 RX ORDER — SODIUM HYPOCHLORITE 1.25 MG/ML
1 SOLUTION TOPICAL AS NEEDED
Status: CANCELLED | OUTPATIENT
Start: 2022-12-08

## 2022-12-08 RX ADMIN — LIDOCAINE HYDROCHLORIDE: 20 JELLY TOPICAL at 11:21

## 2022-12-14 NOTE — PROGRESS NOTES
Wound Clinic Note  Patient Identification:  Name:  Piotr Kc  Age:  61 y.o.  Sex:  male  :  1960  MRN:  [id number]   Visit Number:  [id number]  Primary Care Physician:  Kiel Kline MD     Subjective     Chief complaint:     Right lower leg wound and left lower leg wound     History of presenting illness:     Patient is a 61 y.o. male with past medical history significant for diabetes, and obesity. Presented today for evaluation ofbilateral lower extremity lacerations. Patient states he fell going down some steps and injured legs 10/24/2022. He went to ED For evaluation and workup on that day. He had sutures applied to both the right and left lower legs. He report he is up-to-date on his tetanus shot. He reports that his diabetes is fairly controlled. Denies smoking history. Denies any vascular disease history. Upon evaluation today, he has area of dehisce as well as swelling, erythema and warmth. He has not been prescribed antibiotics. Denies any fever or chills.      Interval History:  2022: Patient seen in clinic today for follow-up to right and left lower leg wounds. Sutures remain in place to the right lower leg. Bruising and swelling continue. Large area of undermining present now to the right lower leg. Left leg sutures with Reports moderate amount of drainage. Denies any fever or chills. Reports pain has improved. Labs reviewed: glucose 111, albumin 3.86, prealbumin 16.0, CRP 3.71, WBC 7.82, sed rate 20.    11/10/2022: Patient seen in clinic today for follow-up to right and left lower leg wounds. Wound to the right lower leg continues to be severe. Areas of black eschar is present. Swelling is unchanged. Left lower leg with yellow slough. Denies any fever or chills. Reports tolerance to current treatment without complications.     2022: patient seen in clinic today for follow-up to right and left lower leg wounds. Wound continues with area of soft black eschar. There  is small area of tunneling. Swelling has decreased. Denies any fever or chills. Pain moderate. No complications from current treatment. Reports glucose levels continue to be controlled.     11/23/2022: Patient seen in clinic today for follow-up to right and left lower leg wounds. Right leg wound continues to be severe. There is less evidence of devitalized tissue. Swelling is stable. Reports tolerance to current  Treatment, no reported complications. Denies any fever or chills. Unable to complete vascular workup at this time due to pain and inability to tolerate AGUSTIN. Will attempt Arterial US.     11/30/2022: Patient seen in clinic today for follow-up to right and left lower leg wounds. Right leg wound continues to be severe. There is less evidence of devitalized tissue. Swelling is stable. Reports tolerance to current  Treatment, no reported complications. Denies any fever or chills. Arterial US to be completed today.     12/08/2022: Patient seen in clinic today for follow-up to right and left lower leg wounds. Right leg wound with increased granulation.  Swelling is stable. Reports tolerance to current  Treatment, no reported complications. Denies any fever or chills. Arterial US to be completed today.   ---------------------------------------------------------------------------------------------------------------------   Review of Systems   Constitutional: Negative for chills and fever.   HENT: Negative for congestion and rhinorrhea.    Respiratory: Negative for shortness of breath.    Cardiovascular: Positive for leg swelling. Negative for chest pain.   Gastrointestinal: Negative for nausea and vomiting.   Musculoskeletal: Negative for back pain and gait problem.   Skin: Positive for wound.   Neurological: Negative for weakness.   Hematological: Does not bruise/bleed easily.   Psychiatric/Behavioral: Negative for confusion. The patient is not nervous/anxious.        ---------------------------------------------------------------------------------------------------------------------   Past Medical History:   Diagnosis Date   • Diabetes mellitus (HCC)    • Diabetes mellitus type I (HCC)    • Hypertension    • Insulin pump in place    • Obesity      Past Surgical History:   Procedure Laterality Date   • COLON SURGERY     • TONSILLECTOMY       Family History   Problem Relation Age of Onset   • Hypertension Mother    • Heart disease Mother    • Atrial fibrillation Mother    • Hypertension Father    • Diabetes Father    • Cancer Sister      Social History     Socioeconomic History   • Marital status:    Tobacco Use   • Smoking status: Never   • Smokeless tobacco: Never   Vaping Use   • Vaping Use: Never used   Substance and Sexual Activity   • Alcohol use: Never   • Drug use: Never   • Sexual activity: Defer     ---------------------------------------------------------------------------------------------------------------------   Allergies:  Keflex [cephalexin]  ---------------------------------------------------------------------------------------------------------------------  Objective     ---------------------------------------------------------------------------------------------------------------------   Vital Signs:     No data found.  There were no vitals filed for this visit.  There is no height or weight on file to calculate BMI.  Wt Readings from Last 3 Encounters:   10/24/22 (!) 156 kg (345 lb)   08/11/22 (!) 155 kg (341 lb)   02/10/22 (!) 158 kg (347 lb 9.6 oz)       ---------------------------------------------------------------------------------------------------------------------   Physical Exam  Constitutional: Vital sign were reviewed (temperature, pulse, respiration, and blood pressure) and found to be within expected limits, general appearance was assessed and the patient was found to be in no distress and calm and comfortable appears  Skin:  Temperature:normal turgor and temperatureColor: normal, no cyanosis, jaundice, pallor or bruising, Moisture: dry,Nails: thickened yellow toenails bed, Hair:thinning to lower extremities .  Physical Exam  Wound Assessment:  Location: Right, anterior, lower, leg  Dressing Appearance: dressingappearance: clean  Closure: sutures  Changes since last exam: bioburden coverage has diminished   Etiology and classification: laceration  Wound bed structures/characteristics: full-thickness (subcutaneous tissue is exposed in at least a portion of the wound), moist, pink  Edges moist  Periwound characteristics: ecchymotic, edematous  Periwound Temperature: normal turgor and temperature  Drainage characteristics: moderate, serous   Perfusion characteristics: suggest some degree of PAD  Left lower leg- healed  Wound Goal (s):Closure, Epithelization, No further symptoms and Reduction of contamination  Assessment & Plan      Laceration of left lower leg without foreign body [S81.812A]  -Healed    Laceration of right lower leg [S81.811A]  -Honeygel to base and secure with kerlix and ACE  -Debridement completed, see below for procedure details.   -Labs ordered: CBC, CMP, CRP, sed rate, prealbumin, and hemoglobain A1C to assess inflammatory markers and nutritional status as this both and negatively impact wound healing if not properly treated.   -Labs reviewed 11/03/2022: glucose 111, albumin 3.86, prealbumin 16.0, CRP 3.71, WBC 7.82, sed rate 20.  -Recommend dipti protein diet 120g/day along with vitamin C 2000mg/day, vitamin A 5000 Units/day, vitamin D3 5000 Units/day, zinc 50mg/day to help promote wound healing   -Arterial US: No segments of hemodynamically significant stenosis or occlusion. Triphasic waveforms.    Type 1 diabetes mellitus   -Recommend adequate glycemic control to help promote wound healing   -Poor glycemic control increases risk of infection, loss of limb and premature death  -Primary care provider managing      Obesity [E66.9]  -An obese person?is at greater risk for wound infection and dehiscence or evisceration.    Wound Care Procedure Note   Pre-Procedure  Pre-Procedure Diagnosis: Laceration of right lower leg [S81.811A] with fat layer exposed,   Checked for Allergies: yes  Consent:Consent obtained, consent given by Patient,Risks Discussed, Alternatives Discussed  Indication: bioburden  Vascular status:Pedal pulses right were found to be adequate and safe for debridment.  Time out was called prior to procedure.   Pre procedure Pain assessment: mild  Pre debridement measurements: Right lower leg Length 9.5cm,Width 5.5m, depth 0.2cm, sinus/tunnelNo, undermining No    Post Procedure  Post-Procedure Diagnosis: Laceration of right lower leg [S81.811A] with fat layer exposed,  Post debridement measurements: Right lower leg Length 9.6cm,Width 5.6cm, depth 0.3cm, sinus/tunnelNo, undermining No  Post procedure Pain assessment: mild  Graft/Implant/Prosthetics/Implanted Device/Transplants:  None  Complication(s):  None    Procedure details:  Method of Debridement: excissional (Surgical removal or cutting away, outside or beyond the wound margin devitalized tissue, necrosis or slough.)  Procedure: The site was prepared using clean techniques, subcutaneous tissue was removed by surgical excision.  Instrument(s) used: Curette 4mm  Anesthesia:After checking patient allergies,lidocaine topical 2% was administered to provide anesthesia.  Tissue removed: subuctaneous, Percent Removed 100%  Culture or Biopsy: culture and sensitivity  Estimated Blood Loss: Small  Hemostasis Obtained: pressure    EVALUATION:  The application of skin substitute has been considered for this non-healing ulcer located on Laceration of right lower leg [S81.811A]. The ulcer has failed to show evidence of healing by adequate advancement of the epithelial margin after 30 days of comprehensive management and standard conservative wound care. The preservice  record has been thoroughly documented and reviewed to ensure the circumstances resulting in wound failure were addressed appropriately and with the required interventions. The patient has been compliant with all recommendations of their comprehensive management to include control of underlying conditions that contributed to the ulcer, appropriate off-loading, and an optimized nutritional plan. Nutritional assessment and planning is based on labs of ALB  3.41, PREALB 16.0.  Vascular assessment arterial US: No segments of hemodynamically significant stenosis or occlusion. Triphasic waveforms Patient's HbA1c is 7.4 and is being managed by primary care provider. There is no evidence of underlying osteomyelitis or cellulitis, and any potential nidus of infection or bacterial colonization has been controlled. The ulcer is clean and free of necrotic debris and exudate with a granular base. Ulcer is full (or partial) skin loss , not involving tendon, muscle, joint capsule, or exhibiting bone or sinus tracts with a clean granular bed. At this time, placement of a cellular and/or tissue-based therapy is recommended, because its dermal matrix should provide the ideal environment to support cellular repopulation. The patient is a non-smoker or has refrained from systemic tobacco intake for at least 4 weeks during conservative wound care and prior to this procedure. Appropriate compression bandaging has been provided with diligent use of multilayer bandages, stockings (>20 mmHg) or velcro devices The addition of Skin Substitutes (PuraPlyAm, Apligraf) is reasonable and necessary for the treatment of this non healing ulceration, and may afford a healing advantage over dressings and conservative treatments that have proven insufficient to complete heal this patient.     Clinical Impression:Moderate Complexity    Follow-up: 1 week    MIGUEL ANGEL Rhodes   [facility]  12/08/2022  1030

## 2022-12-15 ENCOUNTER — HOSPITAL ENCOUNTER (OUTPATIENT)
Dept: WOUND CARE | Facility: HOSPITAL | Age: 62
Discharge: HOME | End: 2022-12-15
Admitting: NURSE PRACTITIONER

## 2022-12-15 VITALS
HEART RATE: 64 BPM | RESPIRATION RATE: 18 BRPM | TEMPERATURE: 98.7 F | DIASTOLIC BLOOD PRESSURE: 64 MMHG | SYSTOLIC BLOOD PRESSURE: 129 MMHG

## 2022-12-15 DIAGNOSIS — T14.8XXA WOUND, OPEN: Primary | ICD-10-CM

## 2022-12-15 RX ORDER — LIDOCAINE HYDROCHLORIDE 20 MG/ML
JELLY TOPICAL AS NEEDED
Status: CANCELLED
Start: 2022-12-15

## 2022-12-15 RX ORDER — LIDOCAINE HYDROCHLORIDE 20 MG/ML
JELLY TOPICAL AS NEEDED
Status: DISCONTINUED | OUTPATIENT
Start: 2022-12-15 | End: 2022-12-16 | Stop reason: HOSPADM

## 2022-12-15 RX ORDER — CASTOR OIL AND BALSAM, PERU 788; 87 MG/G; MG/G
1 OINTMENT TOPICAL AS NEEDED
Status: CANCELLED | OUTPATIENT
Start: 2022-12-15

## 2022-12-15 RX ORDER — SODIUM HYPOCHLORITE 1.25 MG/ML
1 SOLUTION TOPICAL AS NEEDED
Status: CANCELLED | OUTPATIENT
Start: 2022-12-15

## 2022-12-15 RX ORDER — SODIUM HYPOCHLORITE 2.5 MG/ML
1 SOLUTION TOPICAL AS NEEDED
Status: CANCELLED | OUTPATIENT
Start: 2022-12-15

## 2022-12-15 RX ADMIN — LIDOCAINE HYDROCHLORIDE: 20 JELLY TOPICAL at 10:32

## 2022-12-15 NOTE — PROGRESS NOTES
Wound Clinic Note  Patient Identification:  Name:  Piotr Kc  Age:  61 y.o.  Sex:  male  :  1960  MRN:  [id number]   Visit Number:  [id number]  Primary Care Physician:  Kiel Kline MD     Subjective     Chief complaint:     Right lower leg wound and left lower leg wound     History of presenting illness:     Patient is a 61 y.o. male with past medical history significant for diabetes, and obesity. Presented today for evaluation ofbilateral lower extremity lacerations. Patient states he fell going down some steps and injured legs 10/24/2022. He went to ED For evaluation and workup on that day. He had sutures applied to both the right and left lower legs. He report he is up-to-date on his tetanus shot. He reports that his diabetes is fairly controlled. Denies smoking history. Denies any vascular disease history. Upon evaluation today, he has area of dehisce as well as swelling, erythema and warmth. He has not been prescribed antibiotics. Denies any fever or chills.      Interval History:  2022: Patient seen in clinic today for follow-up to right and left lower leg wounds. Sutures remain in place to the right lower leg. Bruising and swelling continue. Large area of undermining present now to the right lower leg. Left leg sutures with Reports moderate amount of drainage. Denies any fever or chills. Reports pain has improved. Labs reviewed: glucose 111, albumin 3.86, prealbumin 16.0, CRP 3.71, WBC 7.82, sed rate 20.    11/10/2022: Patient seen in clinic today for follow-up to right and left lower leg wounds. Wound to the right lower leg continues to be severe. Areas of black eschar is present. Swelling is unchanged. Left lower leg with yellow slough. Denies any fever or chills. Reports tolerance to current treatment without complications.     2022: patient seen in clinic today for follow-up to right and left lower leg wounds. Wound continues with area of soft black eschar. There  is small area of tunneling. Swelling has decreased. Denies any fever or chills. Pain moderate. No complications from current treatment. Reports glucose levels continue to be controlled.     11/23/2022: Patient seen in clinic today for follow-up to right and left lower leg wounds. Right leg wound continues to be severe. There is less evidence of devitalized tissue. Swelling is stable. Reports tolerance to current  Treatment, no reported complications. Denies any fever or chills. Unable to complete vascular workup at this time due to pain and inability to tolerate AGUSTIN. Will attempt Arterial US.     11/30/2022: Patient seen in clinic today for follow-up to right and left lower leg wounds. Right leg wound continues to be severe. There is less evidence of devitalized tissue. Swelling is stable. Reports tolerance to current  Treatment, no reported complications. Denies any fever or chills. Arterial US to be completed today.     12/08/2022: Patient seen in clinic today for follow-up to right and left lower leg wounds. Right leg wound with increased granulation.  Swelling is stable. Reports tolerance to current  Treatment, no reported complications. Denies any fever or chills. Arterial US to be completed today.     12/15/2022: Patient seen in clinic today for follow-up to right and left lower leg wounds. Right leg wound with increased granulation.  Swelling has decreased.  Reports tolerance to current treatment, no reported complications. Denies any fever or chills. US: No segments of hemodynamically significant stenosis or occlusion.  ---------------------------------------------------------------------------------------------------------------------   Review of Systems   Constitutional: Negative for chills and fever.   HENT: Negative for congestion and rhinorrhea.    Respiratory: Negative for shortness of breath.    Cardiovascular: Positive for leg swelling. Negative for chest pain.   Gastrointestinal: Negative for nausea  and vomiting.   Musculoskeletal: Negative for back pain and gait problem.   Skin: Positive for wound.   Neurological: Negative for weakness.   Hematological: Does not bruise/bleed easily.   Psychiatric/Behavioral: Negative for confusion. The patient is not nervous/anxious.       ---------------------------------------------------------------------------------------------------------------------   Past Medical History:   Diagnosis Date   • Diabetes mellitus (HCC)    • Diabetes mellitus type I (HCC)    • Hypertension    • Insulin pump in place    • Obesity      Past Surgical History:   Procedure Laterality Date   • COLON SURGERY     • TONSILLECTOMY       Family History   Problem Relation Age of Onset   • Hypertension Mother    • Heart disease Mother    • Atrial fibrillation Mother    • Hypertension Father    • Diabetes Father    • Cancer Sister      Social History     Socioeconomic History   • Marital status:    Tobacco Use   • Smoking status: Never   • Smokeless tobacco: Never   Vaping Use   • Vaping Use: Never used   Substance and Sexual Activity   • Alcohol use: Never   • Drug use: Never   • Sexual activity: Defer     ---------------------------------------------------------------------------------------------------------------------   Allergies:  Keflex [cephalexin]  ---------------------------------------------------------------------------------------------------------------------  Objective     ---------------------------------------------------------------------------------------------------------------------   Vital Signs:  Temp:  [98.7 °F (37.1 °C)] 98.7 °F (37.1 °C)  Heart Rate:  [64] 64  Resp:  [18] 18  BP: (129)/(64) 129/64  No data found.  There were no vitals filed for this visit.  There is no height or weight on file to calculate BMI.  Wt Readings from Last 3 Encounters:   10/24/22 (!) 156 kg (345 lb)   08/11/22 (!) 155 kg (341 lb)   02/10/22 (!) 158 kg (347 lb 9.6 oz)        ---------------------------------------------------------------------------------------------------------------------   Physical Exam  Constitutional: Vital sign were reviewed (temperature, pulse, respiration, and blood pressure) and found to be within expected limits, general appearance was assessed and the patient was found to be in no distress and calm and comfortable appears  Skin: Temperature:normal turgor and temperatureColor: normal, no cyanosis, jaundice, pallor or bruising, Moisture: dry,Nails: thickened yellow toenails bed, Hair:thinning to lower extremities .  Physical Exam  Wound Assessment:  Location: Right, anterior, lower, leg  Dressing Appearance: dressingappearance: clean  Closure: sutures  Changes since last exam: bioburden coverage has diminished   Etiology and classification: laceration  Wound bed structures/characteristics: full-thickness (subcutaneous tissue is exposed in at least a portion of the wound), moist, pink  Edges moist  Periwound characteristics: ecchymotic, edematous  Periwound Temperature: normal turgor and temperature  Drainage characteristics: moderate, serous   Perfusion characteristics: suggest some degree of PAD  Left lower leg- healed  Wound Goal (s):Closure, Epithelization, No further symptoms and Reduction of contamination  Assessment & Plan      Laceration of left lower leg without foreign body [S81.812A]  -Healed    Laceration of right lower leg [S81.811A]  -Honeygel to base and secure with kerlix and ACE  -Debridement completed, see below for procedure details.   -Labs ordered: CBC, CMP, CRP, sed rate, prealbumin, and hemoglobain A1C to assess inflammatory markers and nutritional status as this both and negatively impact wound healing if not properly treated.   -Labs reviewed 11/03/2022: glucose 111, albumin 3.86, prealbumin 16.0, CRP 3.71, WBC 7.82, sed rate 20.  -Recommend dipti protein diet 120g/day along with vitamin C 2000mg/day, vitamin A 5000 Units/day, vitamin D3  5000 Units/day, zinc 50mg/day to help promote wound healing   -Arterial US: No segments of hemodynamically significant stenosis or occlusion. Triphasic waveforms.    Type 1 diabetes mellitus   -Recommend adequate glycemic control to help promote wound healing   -Poor glycemic control increases risk of infection, loss of limb and premature death  -Primary care provider managing     Obesity [E66.9]  -An obese person?is at greater risk for wound infection and dehiscence or evisceration.    Wound Care Procedure Note   Pre-Procedure  Pre-Procedure Diagnosis: Laceration of right lower leg [S81.811A] with fat layer exposed,   Checked for Allergies: yes  Consent:Consent obtained, consent given by Patient,Risks Discussed, Alternatives Discussed  Indication: bioburden  Vascular status:Pedal pulses right were found to be adequate and safe for debridment.  Time out was called prior to procedure.   Pre procedure Pain assessment: mild  Pre debridement measurements: Right lower leg Length 8.9cm,Width 5.7m, depth 0.2cm, sinus/tunnelNo, undermining No    Post Procedure  Post-Procedure Diagnosis: Laceration of right lower leg [S81.811A] with fat layer exposed,  Post debridement measurements: Right lower leg Length 9cm,Width 5.8cm, depth 0.3cm, sinus/tunnelNo, undermining No  Post procedure Pain assessment: mild  Graft/Implant/Prosthetics/Implanted Device/Transplants:  None  Complication(s):  None    Procedure details:  Method of Debridement: excissional (Surgical removal or cutting away, outside or beyond the wound margin devitalized tissue, necrosis or slough.)  Procedure: The site was prepared using clean techniques, subcutaneous tissue was removed by surgical excision.  Instrument(s) used: Curette 4mm  Anesthesia:After checking patient allergies,lidocaine topical 2% was administered to provide anesthesia.  Tissue removed: subuctaneous, Percent Removed 100%  Culture or Biopsy: culture and sensitivity  Estimated Blood Loss:  Small  Hemostasis Obtained: pressure    EVALUATION:  The application of skin substitute has been considered for this non-healing ulcer located on Laceration of right lower leg [S81.811A]. The ulcer has failed to show evidence of healing by adequate advancement of the epithelial margin after 30 days of comprehensive management and standard conservative wound care. The preservice record has been thoroughly documented and reviewed to ensure the circumstances resulting in wound failure were addressed appropriately and with the required interventions. The patient has been compliant with all recommendations of their comprehensive management to include control of underlying conditions that contributed to the ulcer, appropriate off-loading, and an optimized nutritional plan. Nutritional assessment and planning is based on labs of ALB  3.41, PREALB 16.0.  Vascular assessment arterial US: No segments of hemodynamically significant stenosis or occlusion. Triphasic waveforms Patient's HbA1c is 7.4 and is being managed by primary care provider. There is no evidence of underlying osteomyelitis or cellulitis, and any potential nidus of infection or bacterial colonization has been controlled. The ulcer is clean and free of necrotic debris and exudate with a granular base. Ulcer is full (or partial) skin loss , not involving tendon, muscle, joint capsule, or exhibiting bone or sinus tracts with a clean granular bed. At this time, placement of a cellular and/or tissue-based therapy is recommended, because its dermal matrix should provide the ideal environment to support cellular repopulation. The patient is a non-smoker or has refrained from systemic tobacco intake for at least 4 weeks during conservative wound care and prior to this procedure. Appropriate compression bandaging has been provided with diligent use of multilayer bandages, stockings (>20 mmHg) or velcro devices The addition of Skin Substitutes (PuraPlyAm, Apligraf) is  reasonable and necessary for the treatment of this non healing ulceration, and may afford a healing advantage over dressings and conservative treatments that have proven insufficient to complete heal this patient.     Clinical Impression:Moderate Complexity    Follow-up: 1 week    MIGUEL ANGEL Rhodes   [facility]  12/15/2022  1011

## 2022-12-19 RX ORDER — PROCHLORPERAZINE 25 MG/1
SUPPOSITORY RECTAL
Qty: 1 EACH | Refills: 3 | Status: SHIPPED | OUTPATIENT
Start: 2022-12-19

## 2022-12-22 ENCOUNTER — HOSPITAL ENCOUNTER (OUTPATIENT)
Dept: WOUND CARE | Facility: HOSPITAL | Age: 62
Discharge: HOME | End: 2022-12-22
Admitting: NURSE PRACTITIONER

## 2022-12-22 VITALS
RESPIRATION RATE: 18 BRPM | SYSTOLIC BLOOD PRESSURE: 158 MMHG | DIASTOLIC BLOOD PRESSURE: 79 MMHG | TEMPERATURE: 98.3 F | HEART RATE: 98 BPM

## 2022-12-22 DIAGNOSIS — T14.8XXA WOUND, OPEN: Primary | ICD-10-CM

## 2022-12-22 RX ORDER — LIDOCAINE HYDROCHLORIDE 20 MG/ML
JELLY TOPICAL AS NEEDED
Status: CANCELLED
Start: 2022-12-22

## 2022-12-22 RX ORDER — CASTOR OIL AND BALSAM, PERU 788; 87 MG/G; MG/G
1 OINTMENT TOPICAL AS NEEDED
Status: CANCELLED | OUTPATIENT
Start: 2022-12-22

## 2022-12-22 RX ORDER — SODIUM HYPOCHLORITE 1.25 MG/ML
1 SOLUTION TOPICAL AS NEEDED
Status: CANCELLED | OUTPATIENT
Start: 2022-12-22

## 2022-12-22 RX ORDER — LIDOCAINE HYDROCHLORIDE 20 MG/ML
JELLY TOPICAL AS NEEDED
Status: DISCONTINUED | OUTPATIENT
Start: 2022-12-22 | End: 2022-12-23 | Stop reason: HOSPADM

## 2022-12-22 RX ORDER — SODIUM HYPOCHLORITE 2.5 MG/ML
1 SOLUTION TOPICAL AS NEEDED
Status: CANCELLED | OUTPATIENT
Start: 2022-12-22

## 2022-12-22 RX ADMIN — LIDOCAINE HYDROCHLORIDE 6 ML: 20 JELLY TOPICAL at 09:50

## 2022-12-22 NOTE — PROGRESS NOTES
Wound Clinic Note  Patient Identification:  Name:  Piotr Kc  Age:  61 y.o.  Sex:  male  :  1960  MRN:  [id number]   Visit Number:  [id number]  Primary Care Physician:  Kiel Kline MD     Subjective     Chief complaint:     Right lower leg wound and left lower leg wound     History of presenting illness:     Patient is a 61 y.o. male with past medical history significant for diabetes, and obesity. Presented today for evaluation ofbilateral lower extremity lacerations. Patient states he fell going down some steps and injured legs 10/24/2022. He went to ED For evaluation and workup on that day. He had sutures applied to both the right and left lower legs. He report he is up-to-date on his tetanus shot. He reports that his diabetes is fairly controlled. Denies smoking history. Denies any vascular disease history. Upon evaluation today, he has area of dehisce as well as swelling, erythema and warmth. He has not been prescribed antibiotics. Denies any fever or chills.      Interval History:  2022: Patient seen in clinic today for follow-up to right and left lower leg wounds. Sutures remain in place to the right lower leg. Bruising and swelling continue. Large area of undermining present now to the right lower leg. Left leg sutures with Reports moderate amount of drainage. Denies any fever or chills. Reports pain has improved. Labs reviewed: glucose 111, albumin 3.86, prealbumin 16.0, CRP 3.71, WBC 7.82, sed rate 20.    11/10/2022: Patient seen in clinic today for follow-up to right and left lower leg wounds. Wound to the right lower leg continues to be severe. Areas of black eschar is present. Swelling is unchanged. Left lower leg with yellow slough. Denies any fever or chills. Reports tolerance to current treatment without complications.     2022: patient seen in clinic today for follow-up to right and left lower leg wounds. Wound continues with area of soft black eschar. There  is small area of tunneling. Swelling has decreased. Denies any fever or chills. Pain moderate. No complications from current treatment. Reports glucose levels continue to be controlled.     11/23/2022: Patient seen in clinic today for follow-up to right and left lower leg wounds. Right leg wound continues to be severe. There is less evidence of devitalized tissue. Swelling is stable. Reports tolerance to current  Treatment, no reported complications. Denies any fever or chills. Unable to complete vascular workup at this time due to pain and inability to tolerate AGUSTIN. Will attempt Arterial US.     11/30/2022: Patient seen in clinic today for follow-up to right and left lower leg wounds. Right leg wound continues to be severe. There is less evidence of devitalized tissue. Swelling is stable. Reports tolerance to current  Treatment, no reported complications. Denies any fever or chills. Arterial US to be completed today.     12/08/2022: Patient seen in clinic today for follow-up to right and left lower leg wounds. Right leg wound with increased granulation.  Swelling is stable. Reports tolerance to current  Treatment, no reported complications. Denies any fever or chills. Arterial US to be completed today.     12/15/2022: Patient seen in clinic today for follow-up to right and left lower leg wounds. Right leg wound with increased granulation.  Swelling has decreased.  Reports tolerance to current treatment, no reported complications. Denies any fever or chills. US: No segments of hemodynamically significant stenosis or occlusion.    12/22/2022: Patient seen in clinic today for follow-up to right and left lower leg wounds. Right leg improving with increased granulation tissue. There is less evidence of devitalized tissue. Swelling no longer present. Reports tolerance to current  Treatment, no reported complications. Denies any fever or chills. Arterial US to be completed today.    ---------------------------------------------------------------------------------------------------------------------   Review of Systems   Constitutional: Negative for chills and fever.   HENT: Negative for congestion and rhinorrhea.    Respiratory: Negative for shortness of breath.    Cardiovascular: Positive for leg swelling. Negative for chest pain.   Gastrointestinal: Negative for nausea and vomiting.   Musculoskeletal: Negative for back pain and gait problem.   Skin: Positive for wound.   Neurological: Negative for weakness.   Hematological: Does not bruise/bleed easily.   Psychiatric/Behavioral: Negative for confusion. The patient is not nervous/anxious.       ---------------------------------------------------------------------------------------------------------------------   Past Medical History:   Diagnosis Date   • Diabetes mellitus (HCC)    • Diabetes mellitus type I (HCC)    • Hypertension    • Insulin pump in place    • Obesity      Past Surgical History:   Procedure Laterality Date   • COLON SURGERY     • TONSILLECTOMY       Family History   Problem Relation Age of Onset   • Hypertension Mother    • Heart disease Mother    • Atrial fibrillation Mother    • Hypertension Father    • Diabetes Father    • Cancer Sister      Social History     Socioeconomic History   • Marital status:    Tobacco Use   • Smoking status: Never   • Smokeless tobacco: Never   Vaping Use   • Vaping Use: Never used   Substance and Sexual Activity   • Alcohol use: Never   • Drug use: Never   • Sexual activity: Defer     ---------------------------------------------------------------------------------------------------------------------   Allergies:  Keflex [cephalexin]  ---------------------------------------------------------------------------------------------------------------------  Objective     ---------------------------------------------------------------------------------------------------------------------    Vital Signs:  Temp:  [98.3 °F (36.8 °C)] 98.3 °F (36.8 °C)  Heart Rate:  [98] 98  Resp:  [18] 18  BP: (158)/(79) 158/79  No data found.  There were no vitals filed for this visit.  There is no height or weight on file to calculate BMI.  Wt Readings from Last 3 Encounters:   10/24/22 (!) 156 kg (345 lb)   08/11/22 (!) 155 kg (341 lb)   02/10/22 (!) 158 kg (347 lb 9.6 oz)       ---------------------------------------------------------------------------------------------------------------------   Physical Exam  Constitutional: Vital sign were reviewed (temperature, pulse, respiration, and blood pressure) and found to be within expected limits, general appearance was assessed and the patient was found to be in no distress and calm and comfortable appears  Skin: Temperature:normal turgor and temperatureColor: normal, no cyanosis, jaundice, pallor or bruising, Moisture: dry,Nails: thickened yellow toenails bed, Hair:thinning to lower extremities .  Physical Exam  Wound Assessment:  Location: Right, anterior, lower, leg  Dressing Appearance: dressingappearance: clean  Closure: sutures  Changes since last exam: bioburden coverage has diminished  and granulation has improved   Etiology and classification: laceration  Wound bed structures/characteristics: full-thickness (subcutaneous tissue is exposed in at least a portion of the wound), moist, pink  Edges moist  Periwound characteristics: ecchymotic, edematous  Periwound Temperature: normal turgor and temperature  Drainage characteristics: moderate, serous   Perfusion characteristics: suggest some degree of PAD  Left lower leg- healed  Wound Goal (s):Closure, Epithelization, No further symptoms and Reduction of contamination  Assessment & Plan      Laceration of left lower leg without foreign body [S81.812A]  -Healed    Laceration of right lower leg [S81.811A]  -collagen to base and secure with kerlix and ACE  -Debridement completed, see below for procedure details.    -Labs reviewed 11/03/2022: glucose 111, albumin 3.86, prealbumin 16.0, CRP 3.71, WBC 7.82, sed rate 20.  -Recommend dipti protein diet 120g/day along with vitamin C 2000mg/day, vitamin A 5000 Units/day, vitamin D3 5000 Units/day, zinc 50mg/day to help promote wound healing   -Arterial US: No segments of hemodynamically significant stenosis or occlusion. Triphasic waveforms.    Type 1 diabetes mellitus   -Recommend adequate glycemic control to help promote wound healing   -Poor glycemic control increases risk of infection, loss of limb and premature death  -Primary care provider managing     Obesity [E66.9]  -An obese person?is at greater risk for wound infection and dehiscence or evisceration.    Wound Care Procedure Note   Pre-Procedure  Pre-Procedure Diagnosis: Laceration of right lower leg [S81.811A] with fat layer exposed,   Checked for Allergies: yes  Consent:Consent obtained, consent given by Patient,Risks Discussed, Alternatives Discussed  Indication: bioburden  Vascular status:Pedal pulses right were found to be adequate and safe for debridment.  Time out was called prior to procedure.   Pre procedure Pain assessment: mild  Pre debridement measurements: Right lower leg Length 7cm,Width 3m, depth 0.2cm, sinus/tunnelNo, undermining No    Post Procedure  Post-Procedure Diagnosis: Laceration of right lower leg [S81.811A] with fat layer exposed,  Post debridement measurements: Right lower leg Length 7.1cm,Width 3.1cm, depth 0.21cm, sinus/tunnelNo, undermining No  Post procedure Pain assessment: mild  Graft/Implant/Prosthetics/Implanted Device/Transplants:  None  Complication(s):  None    Procedure details:  Method of Debridement: excissional (Surgical removal or cutting away, outside or beyond the wound margin devitalized tissue, necrosis or slough.)  Procedure: The site was prepared using clean techniques, subcutaneous tissue was removed by surgical excision.  Instrument(s) used: Curette 4mm  Anesthesia:After  checking patient allergies,lidocaine topical 2% was administered to provide anesthesia.  Tissue removed: subuctaneous, Percent Removed 100%  Culture or Biopsy: culture and sensitivity  Estimated Blood Loss: Small  Hemostasis Obtained: pressure    EVALUATION:  The application of skin substitute has been considered for this non-healing ulcer located on Laceration of right lower leg [S81.811A]. The ulcer has failed to show evidence of healing by adequate advancement of the epithelial margin after 30 days of comprehensive management and standard conservative wound care. The preservice record has been thoroughly documented and reviewed to ensure the circumstances resulting in wound failure were addressed appropriately and with the required interventions. The patient has been compliant with all recommendations of their comprehensive management to include control of underlying conditions that contributed to the ulcer, appropriate off-loading, and an optimized nutritional plan. Nutritional assessment and planning is based on labs of ALB  3.41, PREALB 16.0.  Vascular assessment arterial US: No segments of hemodynamically significant stenosis or occlusion. Triphasic waveforms Patient's HbA1c is 7.4 and is being managed by primary care provider. There is no evidence of underlying osteomyelitis or cellulitis, and any potential nidus of infection or bacterial colonization has been controlled. The ulcer is clean and free of necrotic debris and exudate with a granular base. Ulcer is full (or partial) skin loss , not involving tendon, muscle, joint capsule, or exhibiting bone or sinus tracts with a clean granular bed. At this time, placement of a cellular and/or tissue-based therapy is recommended, because its dermal matrix should provide the ideal environment to support cellular repopulation. The patient is a non-smoker or has refrained from systemic tobacco intake for at least 4 weeks during conservative wound care and prior to  this procedure. Appropriate compression bandaging has been provided with diligent use of multilayer bandages, stockings (>20 mmHg) or velcro devices The addition of Skin Substitutes (PuraPlyAm, Apligraf) is reasonable and necessary for the treatment of this non healing ulceration, and may afford a healing advantage over dressings and conservative treatments that have proven insufficient to complete heal this patient.     Clinical Impression:Moderate Complexity    Follow-up: 1 week    MIGUEL ANGEL Rhodes   [facility]  12/22/2022  0912

## 2022-12-29 ENCOUNTER — HOSPITAL ENCOUNTER (OUTPATIENT)
Dept: WOUND CARE | Facility: HOSPITAL | Age: 62
Discharge: HOME | End: 2022-12-29
Admitting: NURSE PRACTITIONER

## 2022-12-29 VITALS
TEMPERATURE: 98.3 F | DIASTOLIC BLOOD PRESSURE: 70 MMHG | HEART RATE: 73 BPM | RESPIRATION RATE: 18 BRPM | SYSTOLIC BLOOD PRESSURE: 153 MMHG

## 2022-12-29 DIAGNOSIS — T14.8XXA WOUND, OPEN: Primary | ICD-10-CM

## 2022-12-29 RX ORDER — SODIUM HYPOCHLORITE 1.25 MG/ML
1 SOLUTION TOPICAL AS NEEDED
Status: CANCELLED | OUTPATIENT
Start: 2022-12-29

## 2022-12-29 RX ORDER — LIDOCAINE HYDROCHLORIDE 20 MG/ML
JELLY TOPICAL AS NEEDED
Status: DISCONTINUED | OUTPATIENT
Start: 2022-12-29 | End: 2022-12-30 | Stop reason: HOSPADM

## 2022-12-29 RX ORDER — SODIUM HYPOCHLORITE 2.5 MG/ML
1 SOLUTION TOPICAL AS NEEDED
Status: CANCELLED | OUTPATIENT
Start: 2022-12-29

## 2022-12-29 RX ORDER — LIDOCAINE HYDROCHLORIDE 20 MG/ML
JELLY TOPICAL AS NEEDED
Status: CANCELLED
Start: 2022-12-29

## 2022-12-29 RX ORDER — CASTOR OIL AND BALSAM, PERU 788; 87 MG/G; MG/G
1 OINTMENT TOPICAL AS NEEDED
Status: CANCELLED | OUTPATIENT
Start: 2022-12-29

## 2022-12-29 RX ADMIN — LIDOCAINE HYDROCHLORIDE 6 ML: 20 JELLY TOPICAL at 10:25

## 2022-12-29 NOTE — PROGRESS NOTES
Wound Clinic Note  Patient Identification:  Name:  Piotr Kc  Age:  62 y.o.  Sex:  male  :  1960  MRN:  [id number]   Visit Number:  [id number]  Primary Care Physician:  Kiel Kline MD     Subjective     Chief complaint:     Right lower leg wound and left lower leg wound     History of presenting illness:     Patient is a 62 y.o. male with past medical history significant for diabetes, and obesity. Presented today for evaluation ofbilateral lower extremity lacerations. Patient states he fell going down some steps and injured legs 10/24/2022. He went to ED For evaluation and workup on that day. He had sutures applied to both the right and left lower legs. He report he is up-to-date on his tetanus shot. He reports that his diabetes is fairly controlled. Denies smoking history. Denies any vascular disease history. Upon evaluation today, he has area of dehisce as well as swelling, erythema and warmth. He has not been prescribed antibiotics. Denies any fever or chills.      Interval History:  2022: Patient seen in clinic today for follow-up to right and left lower leg wounds. Sutures remain in place to the right lower leg. Bruising and swelling continue. Large area of undermining present now to the right lower leg. Left leg sutures with Reports moderate amount of drainage. Denies any fever or chills. Reports pain has improved. Labs reviewed: glucose 111, albumin 3.86, prealbumin 16.0, CRP 3.71, WBC 7.82, sed rate 20.    11/10/2022: Patient seen in clinic today for follow-up to right and left lower leg wounds. Wound to the right lower leg continues to be severe. Areas of black eschar is present. Swelling is unchanged. Left lower leg with yellow slough. Denies any fever or chills. Reports tolerance to current treatment without complications.     2022: patient seen in clinic today for follow-up to right and left lower leg wounds. Wound continues with area of soft black eschar. There  is small area of tunneling. Swelling has decreased. Denies any fever or chills. Pain moderate. No complications from current treatment. Reports glucose levels continue to be controlled.     11/23/2022: Patient seen in clinic today for follow-up to right and left lower leg wounds. Right leg wound continues to be severe. There is less evidence of devitalized tissue. Swelling is stable. Reports tolerance to current  Treatment, no reported complications. Denies any fever or chills. Unable to complete vascular workup at this time due to pain and inability to tolerate AGUSTIN. Will attempt Arterial US.     11/30/2022: Patient seen in clinic today for follow-up to right and left lower leg wounds. Right leg wound continues to be severe. There is less evidence of devitalized tissue. Swelling is stable. Reports tolerance to current  Treatment, no reported complications. Denies any fever or chills. Arterial US to be completed today.     12/08/2022: Patient seen in clinic today for follow-up to right and left lower leg wounds. Right leg wound with increased granulation.  Swelling is stable. Reports tolerance to current  Treatment, no reported complications. Denies any fever or chills. Arterial US to be completed today.     12/15/2022: Patient seen in clinic today for follow-up to right and left lower leg wounds. Right leg wound with increased granulation.  Swelling has decreased.  Reports tolerance to current treatment, no reported complications. Denies any fever or chills. US: No segments of hemodynamically significant stenosis or occlusion.    12/22/2022: Patient seen in clinic today for follow-up to right and left lower leg wounds. Right leg improving with increased granulation tissue. There is less evidence of devitalized tissue. Swelling no longer present. Reports tolerance to current  Treatment, no reported complications. Denies any fever or chills. Arterial US to be completed today.     12/29/2022: seen in clinic today for  follow-up to right and left lower leg wounds. Right leg continues to show increase in granulation. Scarring is present.  There is less evidence of devitalized tissue. Swelling no longer present. Reports tolerance to current  Treatment, no reported complications.  ---------------------------------------------------------------------------------------------------------------------   Review of Systems   Constitutional: Negative for chills and fever.   HENT: Negative for congestion and rhinorrhea.    Respiratory: Negative for shortness of breath.    Cardiovascular: Positive for leg swelling. Negative for chest pain.   Gastrointestinal: Negative for nausea and vomiting.   Musculoskeletal: Negative for back pain and gait problem.   Skin: Positive for wound.   Neurological: Negative for weakness.   Hematological: Does not bruise/bleed easily.   Psychiatric/Behavioral: Negative for confusion. The patient is not nervous/anxious.       ---------------------------------------------------------------------------------------------------------------------   Past Medical History:   Diagnosis Date   • Diabetes mellitus (HCC)    • Diabetes mellitus type I (HCC)    • Hypertension    • Insulin pump in place    • Obesity      Past Surgical History:   Procedure Laterality Date   • COLON SURGERY     • TONSILLECTOMY       Family History   Problem Relation Age of Onset   • Hypertension Mother    • Heart disease Mother    • Atrial fibrillation Mother    • Hypertension Father    • Diabetes Father    • Cancer Sister      Social History     Socioeconomic History   • Marital status:    Tobacco Use   • Smoking status: Never   • Smokeless tobacco: Never   Vaping Use   • Vaping Use: Never used   Substance and Sexual Activity   • Alcohol use: Never   • Drug use: Never   • Sexual activity: Defer     ---------------------------------------------------------------------------------------------------------------------   Allergies:  Keflex  [cephalexin]  ---------------------------------------------------------------------------------------------------------------------  Objective     ---------------------------------------------------------------------------------------------------------------------   Vital Signs:  Temp:  [98.3 °F (36.8 °C)] 98.3 °F (36.8 °C)  Heart Rate:  [73] 73  Resp:  [18] 18  BP: (153)/(70) 153/70  No data found.  There were no vitals filed for this visit.  There is no height or weight on file to calculate BMI.  Wt Readings from Last 3 Encounters:   10/24/22 (!) 156 kg (345 lb)   08/11/22 (!) 155 kg (341 lb)   02/10/22 (!) 158 kg (347 lb 9.6 oz)       ---------------------------------------------------------------------------------------------------------------------   Physical Exam  Constitutional: Vital sign were reviewed (temperature, pulse, respiration, and blood pressure) and found to be within expected limits, general appearance was assessed and the patient was found to be in no distress and calm and comfortable appears  Skin: Temperature:normal turgor and temperatureColor: normal, no cyanosis, jaundice, pallor or bruising, Moisture: dry,Nails: thickened yellow toenails bed, Hair:thinning to lower extremities .  Physical Exam  Wound Assessment:  Location: Right, anterior, lower, leg  Dressing Appearance: dressingappearance: clean  Closure: sutures  Changes since last exam: bioburden coverage has diminished  and granulation has improved   Etiology and classification: laceration  Wound bed structures/characteristics: full-thickness (subcutaneous tissue is exposed in at least a portion of the wound), moist, pink small area of slough.  Edges rolled  Periwound characteristics: ecchymotic, edematous  Periwound Temperature: normal turgor and temperature  Drainage characteristics: moderate, serous   Perfusion characteristics: suggest some degree of PAD  Left lower leg- healed  Wound Goal (s):Closure, Epithelization, No further  symptoms and Reduction of contamination  Assessment & Plan      Laceration of left lower leg without foreign body [S81.812A]  -Healed    Laceration of right lower leg [S81.811A]  -collagen to base and secure with kerlix and ACE  -Debridement completed, see below for procedure details.   -Labs reviewed 11/03/2022: glucose 111, albumin 3.86, prealbumin 16.0, CRP 3.71, WBC 7.82, sed rate 20.  -Recommend dipti protein diet 120g/day along with vitamin C 2000mg/day, vitamin A 5000 Units/day, vitamin D3 5000 Units/day, zinc 50mg/day to help promote wound healing   -Arterial US: No segments of hemodynamically significant stenosis or occlusion. Triphasic waveforms.    Type 1 diabetes mellitus   -Recommend adequate glycemic control to help promote wound healing   -Poor glycemic control increases risk of infection, loss of limb and premature death  -Primary care provider managing     Obesity [E66.9]  -An obese person?is at greater risk for wound infection and dehiscence or evisceration.    Wound Care Procedure Note   Pre-Procedure  Pre-Procedure Diagnosis: Laceration of right lower leg [S81.811A] with fat layer exposed,   Checked for Allergies: yes  Consent:Consent obtained, consent given by Patient,Risks Discussed, Alternatives Discussed  Indication: bioburden  Vascular status:Pedal pulses right were found to be adequate and safe for debridment.  Time out was called prior to procedure.   Pre procedure Pain assessment: mild  Pre debridement measurements: Right lower leg Length 5.5cm,Width 2m, depth 0.2cm, sinus/tunnelNo, undermining No    Post Procedure  Post-Procedure Diagnosis: Laceration of right lower leg [S81.811A] with fat layer exposed,  Post debridement measurements: Right lower leg Length 5.6cm,Width 2.1cm, depth 0.21cm, sinus/tunnelNo, undermining No  Post procedure Pain assessment: mild  Graft/Implant/Prosthetics/Implanted Device/Transplants:  None  Complication(s):  None    Procedure details:  Method of  Debridement: excissional (Surgical removal or cutting away, outside or beyond the wound margin devitalized tissue, necrosis or slough.)  Procedure: The site was prepared using clean techniques, subcutaneous tissue was removed by surgical excision.  Instrument(s) used: Curette 4mm  Anesthesia:After checking patient allergies,lidocaine topical 2% was administered to provide anesthesia.  Tissue removed: subuctaneous, Percent Removed 100%  Culture or Biopsy: culture and sensitivity  Estimated Blood Loss: Small  Hemostasis Obtained: pressure    EVALUATION:  The application of skin substitute has been considered for this non-healing ulcer located on Laceration of right lower leg [S81.811A]. The ulcer has failed to show evidence of healing by adequate advancement of the epithelial margin after 30 days of comprehensive management and standard conservative wound care. The preservice record has been thoroughly documented and reviewed to ensure the circumstances resulting in wound failure were addressed appropriately and with the required interventions. The patient has been compliant with all recommendations of their comprehensive management to include control of underlying conditions that contributed to the ulcer, appropriate off-loading, and an optimized nutritional plan. Nutritional assessment and planning is based on labs of ALB  3.41, PREALB 16.0.  Vascular assessment arterial US: No segments of hemodynamically significant stenosis or occlusion. Triphasic waveforms Patient's HbA1c is 7.4 and is being managed by primary care provider. There is no evidence of underlying osteomyelitis or cellulitis, and any potential nidus of infection or bacterial colonization has been controlled. The ulcer is clean and free of necrotic debris and exudate with a granular base. Ulcer is full (or partial) skin loss , not involving tendon, muscle, joint capsule, or exhibiting bone or sinus tracts with a clean granular bed. At this time,  placement of a cellular and/or tissue-based therapy is recommended, because its dermal matrix should provide the ideal environment to support cellular repopulation. The patient is a non-smoker or has refrained from systemic tobacco intake for at least 4 weeks during conservative wound care and prior to this procedure. Appropriate compression bandaging has been provided with diligent use of multilayer bandages, stockings (>20 mmHg) or velcro devices The addition of Skin Substitutes (PuraPlyAm, Apligraf) is reasonable and necessary for the treatment of this non healing ulceration, and may afford a healing advantage over dressings and conservative treatments that have proven insufficient to complete heal this patient.     Clinical Impression:Moderate Complexity    Follow-up: 1 week    MIGUEL ANGEL Rhodes   [facility]  12/29/2022  [id number]

## 2023-01-05 ENCOUNTER — HOSPITAL ENCOUNTER (OUTPATIENT)
Dept: WOUND CARE | Facility: HOSPITAL | Age: 63
Discharge: HOME | End: 2023-01-05
Admitting: NURSE PRACTITIONER
Payer: COMMERCIAL

## 2023-01-05 VITALS
TEMPERATURE: 98.6 F | DIASTOLIC BLOOD PRESSURE: 71 MMHG | HEART RATE: 80 BPM | BODY MASS INDEX: 44.3 KG/M2 | SYSTOLIC BLOOD PRESSURE: 131 MMHG | WEIGHT: 315 LBS | RESPIRATION RATE: 18 BRPM

## 2023-01-05 DIAGNOSIS — T14.8XXA WOUND, OPEN: Primary | ICD-10-CM

## 2023-01-05 RX ORDER — LIDOCAINE HYDROCHLORIDE 20 MG/ML
JELLY TOPICAL AS NEEDED
Status: CANCELLED
Start: 2023-01-05

## 2023-01-05 RX ORDER — SODIUM HYPOCHLORITE 1.25 MG/ML
1 SOLUTION TOPICAL AS NEEDED
Status: CANCELLED | OUTPATIENT
Start: 2023-01-05

## 2023-01-05 RX ORDER — LIDOCAINE HYDROCHLORIDE 20 MG/ML
JELLY TOPICAL AS NEEDED
Status: DISCONTINUED | OUTPATIENT
Start: 2023-01-05 | End: 2023-01-06 | Stop reason: HOSPADM

## 2023-01-05 RX ORDER — SODIUM HYPOCHLORITE 2.5 MG/ML
1 SOLUTION TOPICAL AS NEEDED
Status: CANCELLED | OUTPATIENT
Start: 2023-01-05

## 2023-01-05 RX ORDER — CASTOR OIL AND BALSAM, PERU 788; 87 MG/G; MG/G
1 OINTMENT TOPICAL AS NEEDED
Status: CANCELLED | OUTPATIENT
Start: 2023-01-05

## 2023-01-05 RX ADMIN — LIDOCAINE HYDROCHLORIDE 6 ML: 20 JELLY TOPICAL at 10:39

## 2023-01-05 NOTE — PROGRESS NOTES
Wound Clinic Note  Patient Identification:  Name:  Piotr Kc  Age:  62 y.o.  Sex:  male  :  1960  MRN:  [id number]   Visit Number:  [id number]  Primary Care Physician:  Kiel Kline MD     Subjective     Chief complaint:     Right lower leg wound and left lower leg wound     History of presenting illness:     Patient is a 62 y.o. male with past medical history significant for diabetes, and obesity. Presented today for evaluation ofbilateral lower extremity lacerations. Patient states he fell going down some steps and injured legs 10/24/2022. He went to ED For evaluation and workup on that day. He had sutures applied to both the right and left lower legs. He report he is up-to-date on his tetanus shot. He reports that his diabetes is fairly controlled. Denies smoking history. Denies any vascular disease history. Upon evaluation today, he has area of dehisce as well as swelling, erythema and warmth. He has not been prescribed antibiotics. Denies any fever or chills.      Interval History:  2022: Patient seen in clinic today for follow-up to right and left lower leg wounds. Sutures remain in place to the right lower leg. Bruising and swelling continue. Large area of undermining present now to the right lower leg. Left leg sutures with Reports moderate amount of drainage. Denies any fever or chills. Reports pain has improved. Labs reviewed: glucose 111, albumin 3.86, prealbumin 16.0, CRP 3.71, WBC 7.82, sed rate 20.    11/10/2022: Patient seen in clinic today for follow-up to right and left lower leg wounds. Wound to the right lower leg continues to be severe. Areas of black eschar is present. Swelling is unchanged. Left lower leg with yellow slough. Denies any fever or chills. Reports tolerance to current treatment without complications.     2022: patient seen in clinic today for follow-up to right and left lower leg wounds. Wound continues with area of soft black eschar. There  is small area of tunneling. Swelling has decreased. Denies any fever or chills. Pain moderate. No complications from current treatment. Reports glucose levels continue to be controlled.     11/23/2022: Patient seen in clinic today for follow-up to right and left lower leg wounds. Right leg wound continues to be severe. There is less evidence of devitalized tissue. Swelling is stable. Reports tolerance to current  Treatment, no reported complications. Denies any fever or chills. Unable to complete vascular workup at this time due to pain and inability to tolerate AGUSTIN. Will attempt Arterial US.     11/30/2022: Patient seen in clinic today for follow-up to right and left lower leg wounds. Right leg wound continues to be severe. There is less evidence of devitalized tissue. Swelling is stable. Reports tolerance to current  Treatment, no reported complications. Denies any fever or chills. Arterial US to be completed today.     12/08/2022: Patient seen in clinic today for follow-up to right and left lower leg wounds. Right leg wound with increased granulation.  Swelling is stable. Reports tolerance to current  Treatment, no reported complications. Denies any fever or chills. Arterial US to be completed today.     12/15/2022: Patient seen in clinic today for follow-up to right and left lower leg wounds. Right leg wound with increased granulation.  Swelling has decreased.  Reports tolerance to current treatment, no reported complications. Denies any fever or chills. US: No segments of hemodynamically significant stenosis or occlusion.    12/22/2022: Patient seen in clinic today for follow-up to right and left lower leg wounds. Right leg improving with increased granulation tissue. There is less evidence of devitalized tissue. Swelling no longer present. Reports tolerance to current  Treatment, no reported complications. Denies any fever or chills. Arterial US to be completed today.     12/29/2022: seen in clinic today for  follow-up to right and left lower leg wounds. Right leg continues to show increase in granulation. Scarring is present.  There is less evidence of devitalized tissue. Swelling no longer present. Reports tolerance to current  Treatment, no reported complications.    01/05/2023: seen in clinic today for follow-up to right and left lower leg wounds. Right leg continues to show increase in granulation. Scar tissue with deep discoloration.Scarring is present.  There is less evidence of devitalized tissue. Swelling no longer present. Reports tolerance to current  Treatment, no reported complications.  ---------------------------------------------------------------------------------------------------------------------   Review of Systems   Constitutional: Negative for chills and fever.   HENT: Negative for congestion and rhinorrhea.    Respiratory: Negative for shortness of breath.    Cardiovascular: Positive for leg swelling. Negative for chest pain.   Gastrointestinal: Negative for nausea and vomiting.   Musculoskeletal: Negative for back pain and gait problem.   Skin: Positive for wound.   Neurological: Negative for weakness.   Hematological: Does not bruise/bleed easily.   Psychiatric/Behavioral: Negative for confusion. The patient is not nervous/anxious.       ---------------------------------------------------------------------------------------------------------------------   Past Medical History:   Diagnosis Date   • Diabetes mellitus (HCC)    • Diabetes mellitus type I (HCC)    • Hypertension    • Insulin pump in place    • Obesity      Past Surgical History:   Procedure Laterality Date   • COLON SURGERY     • TONSILLECTOMY       Family History   Problem Relation Age of Onset   • Hypertension Mother    • Heart disease Mother    • Atrial fibrillation Mother    • Hypertension Father    • Diabetes Father    • Cancer Sister      Social History     Socioeconomic History   • Marital status:    Tobacco Use   •  Smoking status: Never   • Smokeless tobacco: Never   Vaping Use   • Vaping Use: Never used   Substance and Sexual Activity   • Alcohol use: Never   • Drug use: Never   • Sexual activity: Defer     ---------------------------------------------------------------------------------------------------------------------   Allergies:  Keflex [cephalexin]  ---------------------------------------------------------------------------------------------------------------------  Objective     ---------------------------------------------------------------------------------------------------------------------   Vital Signs:  Temp:  [98.6 °F (37 °C)] 98.6 °F (37 °C)  Heart Rate:  [80] 80  Resp:  [18] 18  BP: (131)/(71) 131/71  No data found.  There were no vitals filed for this visit.  Body mass index is 44.3 kg/m².  Wt Readings from Last 3 Encounters:   01/05/23 (!) 156 kg (345 lb)   10/24/22 (!) 156 kg (345 lb)   08/11/22 (!) 155 kg (341 lb)       ---------------------------------------------------------------------------------------------------------------------   Physical Exam  Constitutional: Vital sign were reviewed (temperature, pulse, respiration, and blood pressure) and found to be within expected limits, general appearance was assessed and the patient was found to be in no distress and calm and comfortable appears  Skin: Temperature:normal turgor and temperatureColor: normal, no cyanosis, jaundice, pallor or bruising, Moisture: dry,Nails: thickened yellow toenails bed, Hair:thinning to lower extremities .  Physical Exam  Wound Assessment:  Location: Right, anterior, lower, leg  Dressing Appearance: dressingappearance: clean  Closure: sutures  Changes since last exam: bioburden coverage has diminished  and granulation has improved   Etiology and classification: laceration  Wound bed structures/characteristics: full-thickness (subcutaneous tissue is exposed in at least a portion of the wound), moist, pink small area of  slough.  Edges rolled  Periwound characteristics: ecchymotic, edematous  Periwound Temperature: normal turgor and temperature  Drainage characteristics: moderate, serous   Perfusion characteristics: suggest some degree of PAD  Left lower leg- healed  Wound Goal (s):Closure, Epithelization, No further symptoms and Reduction of contamination  Assessment & Plan      Laceration of left lower leg without foreign body [S81.812A]  -Healed    Laceration of right lower leg [S81.811A]  -collagen to base and secure with kerlix and ACE  -Debridement completed, see below for procedure details.   -Labs reviewed 11/03/2022: glucose 111, albumin 3.86, prealbumin 16.0, CRP 3.71, WBC 7.82, sed rate 20.  -Recommend dipti protein diet 120g/day along with vitamin C 2000mg/day, vitamin A 5000 Units/day, vitamin D3 5000 Units/day, zinc 50mg/day to help promote wound healing   -Arterial US: No segments of hemodynamically significant stenosis or occlusion. Triphasic waveforms.    Type 1 diabetes mellitus   -Recommend adequate glycemic control to help promote wound healing   -Poor glycemic control increases risk of infection, loss of limb and premature death  -Primary care provider managing     Obesity [E66.9]  -An obese person?is at greater risk for wound infection and dehiscence or evisceration.    Wound Care Procedure Note   Pre-Procedure  Pre-Procedure Diagnosis: Laceration of right lower leg [S81.811A] with fat layer exposed,   Checked for Allergies: yes  Consent:Consent obtained, consent given by Patient,Risks Discussed, Alternatives Discussed  Indication: bioburden  Vascular status:Pedal pulses right were found to be adequate and safe for debridment.  Time out was called prior to procedure.   Pre procedure Pain assessment: mild  Pre debridement measurements: Right lower leg Length 5cm,Width 2m, depth 0.1cm, sinus/tunnelNo, undermining No    Post Procedure  Post-Procedure Diagnosis: Laceration of right lower leg [S81.811A] with fat  layer exposed,  Post debridement measurements: Right lower leg Length 5.1cm,Width 2.1cm, depth 0.15cm, sinus/tunnelNo, undermining No  Post procedure Pain assessment: mild  Graft/Implant/Prosthetics/Implanted Device/Transplants:  None  Complication(s):  None    Procedure details:  Method of Debridement: excissional (Surgical removal or cutting away, outside or beyond the wound margin devitalized tissue, necrosis or slough.)  Procedure: The site was prepared using clean techniques, subcutaneous tissue was removed by surgical excision.  Instrument(s) used: Curette 4mm  Anesthesia:After checking patient allergies,lidocaine topical 2% was administered to provide anesthesia.  Tissue removed: subuctaneous, Percent Removed 100%  Culture or Biopsy: culture and sensitivity  Estimated Blood Loss: Small  Hemostasis Obtained: pressure    EVALUATION:  The application of skin substitute has been considered for this non-healing ulcer located on Laceration of right lower leg [S81.811A]. The ulcer has failed to show evidence of healing by adequate advancement of the epithelial margin after 30 days of comprehensive management and standard conservative wound care. The preservice record has been thoroughly documented and reviewed to ensure the circumstances resulting in wound failure were addressed appropriately and with the required interventions. The patient has been compliant with all recommendations of their comprehensive management to include control of underlying conditions that contributed to the ulcer, appropriate off-loading, and an optimized nutritional plan. Nutritional assessment and planning is based on labs of ALB  3.41, PREALB 16.0.  Vascular assessment arterial US: No segments of hemodynamically significant stenosis or occlusion. Triphasic waveforms Patient's HbA1c is 7.4 and is being managed by primary care provider. There is no evidence of underlying osteomyelitis or cellulitis, and any potential nidus of infection or  bacterial colonization has been controlled. The ulcer is clean and free of necrotic debris and exudate with a granular base. Ulcer is full (or partial) skin loss , not involving tendon, muscle, joint capsule, or exhibiting bone or sinus tracts with a clean granular bed. At this time, placement of a cellular and/or tissue-based therapy is recommended, because its dermal matrix should provide the ideal environment to support cellular repopulation. The patient is a non-smoker or has refrained from systemic tobacco intake for at least 4 weeks during conservative wound care and prior to this procedure. Appropriate compression bandaging has been provided with diligent use of multilayer bandages, stockings (>20 mmHg) or velcro devices The addition of Skin Substitutes (PuraPlyAm, Apligraf) is reasonable and necessary for the treatment of this non healing ulceration, and may afford a healing advantage over dressings and conservative treatments that have proven insufficient to complete heal this patient.     Clinical Impression:Moderate Complexity    Follow-up: 1 week    MIGUEL ANGEL Rhodes   [facility]  01/05/2023  1017

## 2023-01-12 ENCOUNTER — HOSPITAL ENCOUNTER (OUTPATIENT)
Dept: WOUND CARE | Facility: HOSPITAL | Age: 63
Discharge: HOME | End: 2023-01-12
Admitting: NURSE PRACTITIONER
Payer: COMMERCIAL

## 2023-01-12 VITALS
RESPIRATION RATE: 18 BRPM | DIASTOLIC BLOOD PRESSURE: 82 MMHG | TEMPERATURE: 98.3 F | HEART RATE: 88 BPM | SYSTOLIC BLOOD PRESSURE: 140 MMHG

## 2023-01-12 DIAGNOSIS — T14.8XXA WOUND, OPEN: ICD-10-CM

## 2023-01-12 DIAGNOSIS — S81.812S: Primary | ICD-10-CM

## 2023-01-12 RX ORDER — CASTOR OIL AND BALSAM, PERU 788; 87 MG/G; MG/G
1 OINTMENT TOPICAL AS NEEDED
OUTPATIENT
Start: 2023-01-12

## 2023-01-12 RX ORDER — LIDOCAINE HYDROCHLORIDE 20 MG/ML
JELLY TOPICAL AS NEEDED
Status: CANCELLED
Start: 2023-01-12

## 2023-01-12 RX ORDER — LIDOCAINE HYDROCHLORIDE 20 MG/ML
JELLY TOPICAL AS NEEDED
Status: DISCONTINUED | OUTPATIENT
Start: 2023-01-12 | End: 2023-01-13 | Stop reason: HOSPADM

## 2023-01-12 RX ORDER — SODIUM HYPOCHLORITE 1.25 MG/ML
1 SOLUTION TOPICAL AS NEEDED
OUTPATIENT
Start: 2023-01-12

## 2023-01-12 RX ORDER — SODIUM HYPOCHLORITE 2.5 MG/ML
1 SOLUTION TOPICAL AS NEEDED
OUTPATIENT
Start: 2023-01-12

## 2023-01-12 RX ADMIN — LIDOCAINE HYDROCHLORIDE: 20 JELLY TOPICAL at 10:22

## 2023-01-26 ENCOUNTER — HOSPITAL ENCOUNTER (OUTPATIENT)
Dept: WOUND CARE | Facility: HOSPITAL | Age: 63
Discharge: HOME | End: 2023-01-26
Admitting: NURSE PRACTITIONER
Payer: COMMERCIAL

## 2023-01-26 VITALS
DIASTOLIC BLOOD PRESSURE: 80 MMHG | RESPIRATION RATE: 16 BRPM | HEART RATE: 80 BPM | TEMPERATURE: 98.7 F | SYSTOLIC BLOOD PRESSURE: 153 MMHG

## 2023-01-26 DIAGNOSIS — T14.8XXA WOUND, OPEN: Primary | ICD-10-CM

## 2023-01-26 PROCEDURE — 97602 WOUND(S) CARE NON-SELECTIVE: CPT

## 2023-01-26 RX ORDER — LIDOCAINE HYDROCHLORIDE 20 MG/ML
JELLY TOPICAL AS NEEDED
Status: DISCONTINUED | OUTPATIENT
Start: 2023-01-26 | End: 2023-01-28 | Stop reason: HOSPADM

## 2023-01-26 RX ORDER — CASTOR OIL AND BALSAM, PERU 788; 87 MG/G; MG/G
1 OINTMENT TOPICAL AS NEEDED
OUTPATIENT
Start: 2023-01-26

## 2023-01-26 RX ORDER — LIDOCAINE HYDROCHLORIDE 20 MG/ML
JELLY TOPICAL AS NEEDED
Start: 2023-01-26

## 2023-01-26 RX ORDER — SODIUM HYPOCHLORITE 1.25 MG/ML
1 SOLUTION TOPICAL AS NEEDED
OUTPATIENT
Start: 2023-01-26

## 2023-01-26 RX ORDER — SODIUM HYPOCHLORITE 2.5 MG/ML
1 SOLUTION TOPICAL AS NEEDED
OUTPATIENT
Start: 2023-01-26

## 2023-01-26 RX ADMIN — LIDOCAINE HYDROCHLORIDE: 20 JELLY TOPICAL at 12:15

## 2023-01-26 NOTE — PROGRESS NOTES
Wound Clinic Note  Patient Identification:  Name:  Piotr Kc  Age:  62 y.o.  Sex:  male  :  1960  MRN:  [id number]   Visit Number:  [id number]  Primary Care Physician:  Kiel Kline MD     Subjective     Chief complaint:     Right lower leg wound and left lower leg wound     History of presenting illness:     Patient is a 62 y.o. male with past medical history significant for diabetes, and obesity. Presented today for evaluation ofbilateral lower extremity lacerations. Patient states he fell going down some steps and injured legs 10/24/2022. He went to ED For evaluation and workup on that day. He had sutures applied to both the right and left lower legs. He report he is up-to-date on his tetanus shot. He reports that his diabetes is fairly controlled. Denies smoking history. Denies any vascular disease history. Upon evaluation today, he has area of dehisce as well as swelling, erythema and warmth. He has not been prescribed antibiotics. Denies any fever or chills.      Interval History:  2022: Patient seen in clinic today for follow-up to right and left lower leg wounds. Sutures remain in place to the right lower leg. Bruising and swelling continue. Large area of undermining present now to the right lower leg. Left leg sutures with Reports moderate amount of drainage. Denies any fever or chills. Reports pain has improved. Labs reviewed: glucose 111, albumin 3.86, prealbumin 16.0, CRP 3.71, WBC 7.82, sed rate 20.    11/10/2022: Patient seen in clinic today for follow-up to right and left lower leg wounds. Wound to the right lower leg continues to be severe. Areas of black eschar is present. Swelling is unchanged. Left lower leg with yellow slough. Denies any fever or chills. Reports tolerance to current treatment without complications.     2022: patient seen in clinic today for follow-up to right and left lower leg wounds. Wound continues with area of soft black eschar. There  is small area of tunneling. Swelling has decreased. Denies any fever or chills. Pain moderate. No complications from current treatment. Reports glucose levels continue to be controlled.     11/23/2022: Patient seen in clinic today for follow-up to right and left lower leg wounds. Right leg wound continues to be severe. There is less evidence of devitalized tissue. Swelling is stable. Reports tolerance to current  Treatment, no reported complications. Denies any fever or chills. Unable to complete vascular workup at this time due to pain and inability to tolerate AGUSTIN. Will attempt Arterial US.     11/30/2022: Patient seen in clinic today for follow-up to right and left lower leg wounds. Right leg wound continues to be severe. There is less evidence of devitalized tissue. Swelling is stable. Reports tolerance to current  Treatment, no reported complications. Denies any fever or chills. Arterial US to be completed today.     12/08/2022: Patient seen in clinic today for follow-up to right and left lower leg wounds. Right leg wound with increased granulation.  Swelling is stable. Reports tolerance to current  Treatment, no reported complications. Denies any fever or chills. Arterial US to be completed today.     12/15/2022: Patient seen in clinic today for follow-up to right and left lower leg wounds. Right leg wound with increased granulation.  Swelling has decreased.  Reports tolerance to current treatment, no reported complications. Denies any fever or chills. US: No segments of hemodynamically significant stenosis or occlusion.    12/22/2022: Patient seen in clinic today for follow-up to right and left lower leg wounds. Right leg improving with increased granulation tissue. There is less evidence of devitalized tissue. Swelling no longer present. Reports tolerance to current  Treatment, no reported complications. Denies any fever or chills. Arterial US to be completed today.     12/29/2022: seen in clinic today for  follow-up to right and left lower leg wounds. Right leg continues to show increase in granulation. Scarring is present.  There is less evidence of devitalized tissue. Swelling no longer present. Reports tolerance to current  Treatment, no reported complications.    01/05/2023: seen in clinic today for follow-up to right and left lower leg wounds. Right leg continues to show increase in granulation. Scar tissue with deep discoloration.Scarring is present.  There is less evidence of devitalized tissue. Swelling no longer present. Reports tolerance to current  Treatment, no reported complications.    01/12/2023: seen in clinic today for follow-up to right and left lower leg wounds. Right leg continues to show increase in granulation. Scarring is present.  There is less evidence of devitalized tissue. Swelling no longer present. Reports tolerance to current  Treatment, no reported complications. Denies any fever or chills.    01/26/2023: Seen in clinic today for follow-up to right lower leg wound. Wound appears to be healed at this time. Denies any other issues or concerns at this time.   ---------------------------------------------------------------------------------------------------------------------   Review of Systems   Constitutional: Negative for chills and fever.   HENT: Negative for congestion and rhinorrhea.    Respiratory: Negative for shortness of breath.    Cardiovascular: Positive for leg swelling. Negative for chest pain.   Gastrointestinal: Negative for nausea and vomiting.   Musculoskeletal: Negative for back pain and gait problem.   Skin: Negative for wound.   Neurological: Negative for weakness.   Hematological: Does not bruise/bleed easily.   Psychiatric/Behavioral: Negative for confusion. The patient is not nervous/anxious.       ---------------------------------------------------------------------------------------------------------------------   Past Medical History:   Diagnosis Date   • Diabetes  mellitus (HCC)    • Diabetes mellitus type I (HCC)    • Hypertension    • Insulin pump in place    • Obesity      Past Surgical History:   Procedure Laterality Date   • COLON SURGERY     • TONSILLECTOMY       Family History   Problem Relation Age of Onset   • Hypertension Mother    • Heart disease Mother    • Atrial fibrillation Mother    • Hypertension Father    • Diabetes Father    • Cancer Sister      Social History     Socioeconomic History   • Marital status:    Tobacco Use   • Smoking status: Never   • Smokeless tobacco: Never   Vaping Use   • Vaping Use: Never used   Substance and Sexual Activity   • Alcohol use: Never   • Drug use: Never   • Sexual activity: Defer     ---------------------------------------------------------------------------------------------------------------------   Allergies:  Keflex [cephalexin]  ---------------------------------------------------------------------------------------------------------------------  Objective     ---------------------------------------------------------------------------------------------------------------------   Vital Signs:  Temp:  [98.7 °F (37.1 °C)] 98.7 °F (37.1 °C)  Heart Rate:  [80] 80  Resp:  [16] 16  BP: (153)/(80) 153/80  No data found.  There were no vitals filed for this visit.  There is no height or weight on file to calculate BMI.  Wt Readings from Last 3 Encounters:   01/05/23 (!) 156 kg (345 lb)   10/24/22 (!) 156 kg (345 lb)   08/11/22 (!) 155 kg (341 lb)       ---------------------------------------------------------------------------------------------------------------------   Physical Exam  Constitutional: Vital sign were reviewed (temperature, pulse, respiration, and blood pressure) and found to be within expected limits, general appearance was assessed and the patient was found to be in no distress and calm and comfortable appears  Skin: Temperature:normal turgor and temperatureColor: normal, no cyanosis, jaundice, pallor or  bruising, Moisture: dry,Nails: thickened yellow toenails bed, Hair:thinning to lower extremities .  Physical Exam  Wound Assessment:     01/26/23 1009   Wound 10/27/22 1010 Right lower leg   Placement Date/Time: 10/27/22 1010   Side: Right  Orientation: lower  Location: leg   Wound Image    Dressing Appearance moist drainage;intact   Closure Adhesive bandage   Base moist;pink;red   Periwound Temperature warm   Periwound Skin Turgor soft   Edges irregular   Wound Length (cm) 0.3 cm   Wound Width (cm) 0.1 cm   Wound Depth (cm) 0.1 cm   Wound Surface Area (cm^2) 0.03 cm^2   Wound Volume (cm^3) 0.003 cm^3   Tunneling [Depth (cm)/Location] 0   Undermining [Depth (cm)/Location] 0   Drainage Amount none   Care, Wound cleansed with;sterile normal saline;dressing removed   Dressing Care dressing applied;border dressing   Periwound Care dry periwound area maintained     Wound Goal (s):Closure, Epithelization, No further symptoms and Reduction of contamination  Assessment & Plan      Laceration of left lower leg without foreign body [S81.812A]  -Healed    Laceration of right lower leg [S81.811A]  -healed    Type 1 diabetes mellitus   -Recommend adequate glycemic control to help promote wound healing   -Poor glycemic control increases risk of infection, loss of limb and premature death  -Primary care provider managing     Obesity [E66.9]  -An obese person?is at greater risk for wound infection and dehiscence or evisceration.    Clinical Impression:Low Complexity    Follow-up: as needed    MIGUEL ANGEL Rhodes   [facility]  01/26/2023  1015

## 2023-02-09 ENCOUNTER — SPECIALTY PHARMACY (OUTPATIENT)
Dept: PHARMACY | Facility: HOSPITAL | Age: 63
End: 2023-02-09
Payer: COMMERCIAL

## 2023-02-09 ENCOUNTER — OFFICE VISIT (OUTPATIENT)
Dept: ENDOCRINOLOGY | Facility: CLINIC | Age: 63
End: 2023-02-09
Payer: COMMERCIAL

## 2023-02-09 VITALS
SYSTOLIC BLOOD PRESSURE: 130 MMHG | WEIGHT: 315 LBS | OXYGEN SATURATION: 95 % | HEIGHT: 74 IN | BODY MASS INDEX: 40.43 KG/M2 | DIASTOLIC BLOOD PRESSURE: 74 MMHG | HEART RATE: 90 BPM

## 2023-02-09 DIAGNOSIS — E10.649 TYPE 1 DIABETES MELLITUS WITH HYPOGLYCEMIA AND WITHOUT COMA: Primary | ICD-10-CM

## 2023-02-09 LAB — GLUCOSE BLDC GLUCOMTR-MCNC: 197 MG/DL (ref 70–130)

## 2023-02-09 PROCEDURE — 99213 OFFICE O/P EST LOW 20 MIN: CPT | Performed by: NURSE PRACTITIONER

## 2023-02-09 PROCEDURE — 95251 CONT GLUC MNTR ANALYSIS I&R: CPT | Performed by: NURSE PRACTITIONER

## 2023-02-09 NOTE — ASSESSMENT & PLAN NOTE
-Diabetes is improving with A1c down from 7.9 to 7.5.  -Discussed dietary and exercise guidelines.  -Discussed importance of yearly eye exams.  -Discussed importance of taking insulin as meal times to control PPD values.  Continue using Dexcom CGM with Tandem insulin pump for control of BG's.  Data download shows the following:  Above Target: 67%  In Range: 25%  Below Range: 8%  Basal 33%  Food Bolus 64%   Trend shows that he is having some increased hypoglycemia throughout the day prior to meals.  Will make adjustments as below:  12A: 1.500 u/hr  3A: 1.900 u/hr  6A: 1.9 u/hr  10:30 P: 1.900 u/hr  -S/S hypoglycemia reviewed with Rule of 15's advised.  -Follow-up in 6 months.

## 2023-02-09 NOTE — PROGRESS NOTES
Specialty Pharmacy Patient Management Program  Endocrinology Initial Assessment       Piotr Kc is a 62 y.o. male with Type 1 Diabetes seen by an Endocrinology provider and enrolled in the Endocrinology Patient Management program offered by [facility].  An initial outreach was conducted, including assessment of therapy appropriateness and specialty medication education for insulin therapy, CGM.  The patient was introduced to services offered by [facility], including: regular assessments, refill coordination, curbside pick-up or mail order delivery options, prior authorization maintenance, and financial assistance programs as applicable. The patient was also provided with contact information for the pharmacy team.    Patient is using a Tandem pump with Dexcom. He reports he has occasional hypoglycemia that varies between 0-3 times a week, typically throughout the nighttime. He reports that he gives additional insulin when BG around 260. Patient's currently on steroids for wound care.     Insurance Coverage & Financial Support  [facility]    Relevant Past Medical History and Comorbidities  Relevant medical history and concomitant health conditions were discussed with the patient. The patient's chart has been reviewed for relevant past medical history and comorbid health conditions and updated as necessary.   Past Medical History:   Diagnosis Date   • Diabetes mellitus (HCC)    • Diabetes mellitus type I (HCC)    • Hypertension    • Insulin pump in place    • Obesity      Social History     Socioeconomic History   • Marital status:    Tobacco Use   • Smoking status: Never   • Smokeless tobacco: Never   Vaping Use   • Vaping Use: Never used   Substance and Sexual Activity   • Alcohol use: Never   • Drug use: Never   • Sexual activity: Defer       Allergies  Known allergies and reactions were discussed with the patient. The patient's chart has been  reviewed for  allergy information and updated as necessary.   Allergies   Allergen Reactions   • Keflex [Cephalexin] Rash       Current Medication List  This medication list has been reviewed with the patient and evaluated for any interactions or necessary modifications/recommendations, and updated to include all prescription medications, OTC medications, and supplements the patient is currently taking.  This list reflects what is contained in the patient's profile, which has also been marked as reviewed to communicate to other providers it is the most up to date version of the patient's current medication therapy.     Current Outpatient Medications:   •  atorvastatin (LIPITOR) 40 MG tablet, Take 40 mg by mouth Every Night., Disp: , Rfl:   •  baclofen (LIORESAL) 10 MG tablet, Take 10 mg by mouth Daily As Needed., Disp: , Rfl:   •  Blood Glucose Monitoring Suppl (Accu-Chek Dolores Plus) w/Device kit, DEVISE FOR CHECKING BG, Disp: 1 kit, Rfl: 0  •  Budesonide (ENTOCORT EC) 3 MG 24 hr capsule, Take 3 mg by mouth Every Night., Disp: , Rfl:   •  Continuous Blood Gluc Sensor (Dexcom G6 Sensor), Change every 10 days, Disp: 9 each, Rfl: 3  •  Continuous Blood Gluc Transmit (Dexcom G6 Transmitter) misc, FOR CHECKING BLOOD SUGAR, CHANGE EVERY 90 DAYS, Disp: 1 each, Rfl: 3  •  esomeprazole (NexIUM) 40 MG packet, Take 40 mg by mouth 2 (Two) Times a Day With Meals., Disp: , Rfl:   •  glucose blood test strip, To test once daily to calibrate CGM., Disp: 100 each, Rfl: 2  •  Insulin Aspart (novoLOG) 100 UNIT/ML injection, Use via insulin pump.  Max daily dose 170 units, Disp: 60 mL, Rfl: 5  •  Insulin Infusion Pump Supplies (T:slim X2 3mL Cartridge) misc, CHANGE EVERY THREE (3) DAYS, Disp: 45 each, Rfl: 5  •  Lancets misc, To test once daily to calibrate CGM., Disp: 100 each, Rfl: 3  •  Motegrity 2 MG tablet, Take 1 tablet by mouth Daily., Disp: , Rfl:   •  ubrogepant (UBRELVY) 100 MG tablet, Take 100 mg by mouth As Needed., Disp: ,  Rfl:   •  valsartan-hydrochlorothiazide (DIOVAN-HCT) 320-12.5 MG per tablet, Take 1 tablet by mouth Daily., Disp: , Rfl:   •  Insulin Infusion Pump Supplies (AutoSoft XC Infusion Set) misc, CHANGE EVERY THREE (3) DAYS, Disp: 45 each, Rfl: 5  No current facility-administered medications for this visit.    Drug Interactions  No significant drug-drug interactions with diabetes medications expected according to literature.    Recommended Medications Assessment  • Aspirin - Not Indicated  • Statin - Currently Taking  • ACEi/ARB - Currently Taking    Current 10-Year ASCVD Risk: 15.4%    Relevant Laboratory Values  A1C Last 3 Results    HGBA1C Last 3 Results 11/23/22   Hemoglobin A1C 7.10 (A)   (A) Abnormal value            Lab Results   Component Value Date    HGBA1C 7.10 (H) 11/23/2022     Lab Results   Component Value Date    GLUCOSE 107 (H) 11/23/2022    CALCIUM 9.3 11/23/2022     11/23/2022    K 5.0 11/23/2022    CO2 20.3 (L) 11/23/2022     11/23/2022    BUN 16 11/23/2022    CREATININE 0.92 11/23/2022    BCR 17.4 11/23/2022    ANIONGAP 13.7 11/23/2022     No results found for: CHOL, CHLPL, TRIG, HDL, LDL, LDLDIRECT    Adherence and Self-Administration  • Barriers to Patient Adherence and/or Self-Administration: None  • Methods for Supporting Patient Adherence and/or Self-Administration: None Required     Vaccination Status:   COVID 19: UTD  Influenza: Needs, not interested at this time  Pneumococcal: UTD  Hep B: Unsure    Smoker?  • Never    Goals of Therapy  • Patient Goals of Therapy: Take medication every day  • Clinical Goals or Therapeutic Targets, If Applicable: A1C Goal/Reduction    Reassessment Plan & Follow-Up  1. Patient's diabetes is uncontrolled, however it has improved with A1C decreasing from 7.9 to 7.5%.  2. Recent Provider Changes:    a. Provider to make adjustments to Tandem dose settings to prevent further hypoglycemia symptoms.  3. Additional Plans, Therapy Recommendations or Therapy  Problems to Be Addressed: None    4. Patient denies issues with affordability or adherence at this time.     Patient does not wish to use [facility] at this time due to: loyalty with independent pharmacy    Attestation  I attest that the initiated specialty medication(s) are appropriate for the patient based on my assessment.  If the prescribed therapy is at any point deemed not appropriate based on the current or future assessments, a consultation will be initiated with the patient's specialty care provider to determine the best course of action. The revised plan of therapy will be documented along with any additional patient education provided.     Thank you,    Mary Lundy [facility]  02/09/23  14:32 EST

## 2023-02-09 NOTE — PROGRESS NOTES
"Chief Complaint   Patient presents with   • Diabetes     Pt had a1c done on Jan 24, 2023.  7.5%          HPI   Piotr Kc is a 62 y.o. male had concerns including Diabetes (Pt had a1c done on Jan 24, 2023.  7.5%).      He uses a Dexcom CGM with tandem insulin pump for control of his BG's.  Most recent optho exam: Amilcar QUIROZ  Place of optho exam:  Hypos: occasionally.  He has been having some overt lows throughout the day that is causing him to not administer meal time doses in fear of becoming too hypo after meals.  Most recent A1c 7.9 (7/2022), 7.5 (01/24/2023)    Current pump settings:  12A: 3.150 u/hr  3A: 1.9 u/hr  10:30P: 1.90 u/hr    He has been having increased hyper and hypos. He recently got a new insulin and changed over his settings. He has noticed some increased symptoms.    The following portions of the patient's history were reviewed and updated as appropriate: allergies, current medications, past family history, past medical history, past social history, past surgical history and problem list.      Review of Systems   Constitutional: Negative.    Eyes: Negative.    Endocrine: Negative.    Psychiatric/Behavioral: Negative.    All other systems reviewed and are negative.       Physical Exam  Vitals reviewed.   Constitutional:       Appearance: Normal appearance.   Eyes:      Extraocular Movements: Extraocular movements intact.   Cardiovascular:      Rate and Rhythm: Tachycardia present.   Pulmonary:      Effort: Pulmonary effort is normal.   Skin:     General: Skin is warm.   Neurological:      Mental Status: He is alert and oriented to person, place, and time.   Psychiatric:         Mood and Affect: Mood normal.         Behavior: Behavior normal.         Thought Content: Thought content normal.         Judgment: Judgment normal.        /74 (BP Location: Left arm, Patient Position: Sitting, Cuff Size: Adult)   Pulse 90   Ht 188 cm (74\")   Wt (!) 160 kg (352 lb)   SpO2 95%   BMI " 45.19 kg/m²      Labs and imaging    CMP:  Lab Results   Component Value Date    BUN 16 11/23/2022    CREATININE 0.92 11/23/2022    BCR 17.4 11/23/2022     11/23/2022    K 5.0 11/23/2022    CO2 20.3 (L) 11/23/2022    CALCIUM 9.3 11/23/2022    ALBUMIN 3.41 (L) 11/23/2022    BILITOT 0.4 11/23/2022    ALKPHOS 80 11/23/2022    AST 18 11/23/2022    ALT 16 11/23/2022     Lipid Panel:  No results found for: CHOL, TRIG, HDL, VLDL, LDL  HbA1c:  Lab Results   Component Value Date    HGBA1C 7.10 (H) 11/23/2022    HGBA1C 7.4 02/10/2022     Glucose:    Lab Results   Component Value Date    POCGLU 197 (A) 02/09/2023     Microalbumin:  No results found for: MALBCRERATIO  TSH:  No results found for: TSH    Assessment and plan  Diagnoses and all orders for this visit:    1. Type 1 diabetes mellitus with hypoglycemia and without coma (HCC) (Primary)  Assessment & Plan:  -Diabetes is improving with A1c down from 7.9 to 7.5.  -Discussed dietary and exercise guidelines.  -Discussed importance of yearly eye exams.  -Discussed importance of taking insulin as meal times to control PPD values.  Continue using Dexcom CGM with Tandem insulin pump for control of BG's.  Data download shows the following:  Above Target: 67%  In Range: 25%  Below Range: 8%  Basal 33%  Food Bolus 64%   Trend shows that he is having some increased hypoglycemia throughout the day prior to meals.  Will make adjustments as below:  12A: 1.500 u/hr  3A: 1.900 u/hr  6A: 1.9 u/hr  10:30 P: 1.900 u/hr  -S/S hypoglycemia reviewed with Rule of 15's advised.  -Follow-up in 6 months.    Orders:  -     POC Glucose Fingerstick       Return in about 6 months (around 8/9/2023) for Follow-up appointment, A1C. The patient was instructed to contact the clinic with any interval questions or concerns.    This document has been electronically signed by MIGUEL ANGEL Galarza  February 9, 2023 14:44 EST  Endocrinology

## 2023-03-10 RX ORDER — INSULIN ASPART 100 [IU]/ML
INJECTION, SOLUTION INTRAVENOUS; SUBCUTANEOUS
Qty: 60 ML | Refills: 5 | Status: SHIPPED | OUTPATIENT
Start: 2023-03-10

## 2023-04-11 RX ORDER — PROCHLORPERAZINE 25 MG/1
SUPPOSITORY RECTAL
Qty: 3 EACH | Refills: 5 | Status: SHIPPED | OUTPATIENT
Start: 2023-04-11

## 2023-08-03 ENCOUNTER — SPECIALTY PHARMACY (OUTPATIENT)
Dept: PHARMACY | Facility: HOSPITAL | Age: 63
End: 2023-08-03
Payer: COMMERCIAL

## 2023-08-03 ENCOUNTER — OFFICE VISIT (OUTPATIENT)
Dept: ENDOCRINOLOGY | Facility: CLINIC | Age: 63
End: 2023-08-03
Payer: COMMERCIAL

## 2023-08-03 VITALS
DIASTOLIC BLOOD PRESSURE: 64 MMHG | OXYGEN SATURATION: 95 % | HEART RATE: 94 BPM | SYSTOLIC BLOOD PRESSURE: 128 MMHG | HEIGHT: 74 IN | BODY MASS INDEX: 40.43 KG/M2 | WEIGHT: 315 LBS

## 2023-08-03 DIAGNOSIS — E10.649 TYPE 1 DIABETES MELLITUS WITH HYPOGLYCEMIA AND WITHOUT COMA: Primary | ICD-10-CM

## 2023-08-03 LAB — GLUCOSE BLDC GLUCOMTR-MCNC: 227 MG/DL (ref 70–130)

## 2023-09-18 RX ORDER — PROCHLORPERAZINE 25 MG/1
SUPPOSITORY RECTAL
Qty: 3 EACH | Refills: 5 | Status: SHIPPED | OUTPATIENT
Start: 2023-09-18

## 2023-10-16 RX ORDER — INSULIN ASPART 100 [IU]/ML
INJECTION, SOLUTION INTRAVENOUS; SUBCUTANEOUS
Qty: 50 ML | Refills: 5 | Status: SHIPPED | OUTPATIENT
Start: 2023-10-16

## 2023-10-16 NOTE — TELEPHONE ENCOUNTER
Rx Refill Note  Requested Prescriptions     Pending Prescriptions Disp Refills    NovoLOG 100 UNIT/ML injection [Pharmacy Med Name: NOVOLOG INSULIN 100 Solution] 50 mL 6     Sig: USE VIA INSULIN PUMP. MAX DAILY DOSE 170 UNITS        Last office visit with prescribing clinician: 8/3/2023      Next office visit with prescribing clinician: 2/1/2024       Annamaria Roa (Jodi)  10/16/23, 15:56 EDT

## 2024-02-01 ENCOUNTER — OFFICE VISIT (OUTPATIENT)
Dept: ENDOCRINOLOGY | Facility: CLINIC | Age: 64
End: 2024-02-01
Payer: COMMERCIAL

## 2024-02-01 VITALS
DIASTOLIC BLOOD PRESSURE: 70 MMHG | BODY MASS INDEX: 40.43 KG/M2 | OXYGEN SATURATION: 97 % | SYSTOLIC BLOOD PRESSURE: 130 MMHG | HEART RATE: 94 BPM | WEIGHT: 315 LBS | HEIGHT: 74 IN

## 2024-02-01 DIAGNOSIS — E10.649 TYPE 1 DIABETES MELLITUS WITH HYPOGLYCEMIA AND WITHOUT COMA: Primary | ICD-10-CM

## 2024-02-01 RX ORDER — INSULIN ASPART 100 [IU]/ML
INJECTION, SOLUTION INTRAVENOUS; SUBCUTANEOUS
Qty: 60 ML | Refills: 5 | Status: SHIPPED | OUTPATIENT
Start: 2024-02-01

## 2024-02-01 NOTE — PROGRESS NOTES
Chief Complaint   Patient presents with    Diabetes        Piotr Kc is a 63 y.o. male had concerns including Diabetes.    T1DM.    He uses a Dexcom CGM with tandem insulin pump for control of his BG's.  Most recent optho exam: Amilcar QUIROZ  Place of optho exam:  Hypos: occasionally.  He has been having some overt lows throughout the day that is causing him to not administer meal time doses in fear of becoming too hypo after meals.  Most recent A1c 7.9 (7/2022), 7.5 (01/24/2023), 7.5 (08/3/2023), 7.7 (01/2024)    Current pump settings:  12A: 3.150 u/hr  3A: 2.0 u/hr  10:30P: 1.90 u/hr  CR 1:4  CF 1:30    He has overt hyper and hypos. He has tried to adjust settings in the past, but they have not resolved the hyper/hypo events.  His current settings have been the most regulated he has been able to find.    The following portions of the patient's history were reviewed and updated as appropriate: allergies, current medications, past family history, past medical history, past social history, past surgical history and problem list.      Review of Systems   Constitutional: Negative.    Eyes: Negative.    Endocrine: Negative.    Psychiatric/Behavioral: Negative.     All other systems reviewed and are negative.       Physical Exam  Vitals reviewed.   Constitutional:       Appearance: Normal appearance.   Eyes:      Extraocular Movements: Extraocular movements intact.   Cardiovascular:      Rate and Rhythm: Tachycardia present.      Pulses:           Dorsalis pedis pulses are 2+ on the right side and 2+ on the left side.        Posterior tibial pulses are 2+ on the right side and 2+ on the left side.   Pulmonary:      Effort: Pulmonary effort is normal.   Feet:      Right foot:      Protective Sensation: 10 sites tested.  10 sites sensed.      Skin integrity: Skin integrity normal.      Toenail Condition: Right toenails are normal.      Left foot:      Protective Sensation: 10 sites tested.  10 sites sensed.       "Skin integrity: Skin integrity normal.      Toenail Condition: Left toenails are normal.      Comments: Diabetic Foot Exam Performed and Monofilament Test Performed     Skin:     General: Skin is warm.   Neurological:      Mental Status: He is alert and oriented to person, place, and time.   Psychiatric:         Mood and Affect: Mood normal.         Behavior: Behavior normal.         Thought Content: Thought content normal.         Judgment: Judgment normal.        /70 (BP Location: Left arm, Patient Position: Sitting, Cuff Size: Adult)   Pulse 94   Ht 188 cm (74\")   Wt (!) 159 kg (350 lb)   SpO2 97%   BMI 44.94 kg/m²      Labs and imaging    CMP:  Lab Results   Component Value Date    BUN 16 11/23/2022    CREATININE 0.92 11/23/2022    BCR 17.4 11/23/2022     11/23/2022    K 5.0 11/23/2022    CO2 20.3 (L) 11/23/2022    CALCIUM 9.3 11/23/2022    ALBUMIN 3.41 (L) 11/23/2022    BILITOT 0.4 11/23/2022    ALKPHOS 80 11/23/2022    AST 18 11/23/2022    ALT 16 11/23/2022     Lipid Panel:  No results found for: \"CHOL\", \"TRIG\", \"HDL\", \"VLDL\", \"LDL\"  HbA1c:  Lab Results   Component Value Date    HGBA1C 7.10 (H) 11/23/2022    HGBA1C 7.4 02/10/2022     Glucose:  Lab Results   Component Value Date    POCGLU 227 (A) 08/03/2023     Microalbumin:  No results found for: \"MALBCRERATIO\"  TSH:  No results found for: \"TSH\"    Assessment and plan  Diagnoses and all orders for this visit:    1. Type 1 diabetes mellitus with hypoglycemia and without coma (Primary)  Assessment & Plan:  -Diabetes remains unchanged with A1c 7.7.  -Discussed dietary and exercise guidelines.  -Discussed importance of yearly eye exams.  -Discussed importance of taking insulin as meal times to control PPD values.  Continue using Dexcom CGM with Tandem insulin pump for control of BG's.  Data download shows the following:  Above Target: 74%  In Range: 21%  Below Range: 5%  Basal 31%  Food Bolus 66%   Trend shows overt hyper and hypo events.  " Continue with current pump settings:  12A: 3.150 u/hr  3A: 2.0 u/hr  10:30P: 1.90 u/hr  CR 1:4  CF 1:30  Discussed not administering as many boluses to prevent hypos.  -S/S hypoglycemia reviewed with Rule of 15's advised.  -Follow-up in 3 months.      Other orders  -     Insulin Aspart (NovoLOG) 100 UNIT/ML injection; For use in insulin pump.  MDD: 200.  Dispense: 60 mL; Refill: 5           Return in about 3 months (around 5/1/2024) for Follow-up appointment, A1C. The patient was instructed to contact the clinic with any interval questions or concerns.    This document has been electronically signed by MIGUEL ANGEL Galarza  February 9, 2024 13:23 EST  Endocrinology

## 2024-02-09 NOTE — ASSESSMENT & PLAN NOTE
-Diabetes remains unchanged with A1c 7.7.  -Discussed dietary and exercise guidelines.  -Discussed importance of yearly eye exams.  -Discussed importance of taking insulin as meal times to control PPD values.  Continue using Dexcom CGM with Tandem insulin pump for control of BG's.  Data download shows the following:  Above Target: 74%  In Range: 21%  Below Range: 5%  Basal 31%  Food Bolus 66%   Trend shows overt hyper and hypo events.  Continue with current pump settings:  12A: 3.150 u/hr  3A: 2.0 u/hr  10:30P: 1.90 u/hr  CR 1:4  CF 1:30  Discussed not administering as many boluses to prevent hypos.  -S/S hypoglycemia reviewed with Rule of 15's advised.  -Follow-up in 3 months.

## 2024-02-27 RX ORDER — PROCHLORPERAZINE 25 MG/1
SUPPOSITORY RECTAL
Qty: 1 EACH | Refills: 3 | Status: SHIPPED | OUTPATIENT
Start: 2024-02-27

## 2024-02-27 RX ORDER — PROCHLORPERAZINE 25 MG/1
SUPPOSITORY RECTAL
Qty: 3 EACH | Refills: 5 | Status: SHIPPED | OUTPATIENT
Start: 2024-02-27

## 2024-05-09 ENCOUNTER — OFFICE VISIT (OUTPATIENT)
Dept: ENDOCRINOLOGY | Facility: CLINIC | Age: 64
End: 2024-05-09
Payer: COMMERCIAL

## 2024-05-09 VITALS
SYSTOLIC BLOOD PRESSURE: 147 MMHG | HEART RATE: 79 BPM | WEIGHT: 315 LBS | OXYGEN SATURATION: 95 % | HEIGHT: 74 IN | BODY MASS INDEX: 40.43 KG/M2 | DIASTOLIC BLOOD PRESSURE: 80 MMHG

## 2024-05-09 DIAGNOSIS — E13.9 DIABETES MELLITUS TYPE 1.5, MANAGED AS TYPE 2: Primary | ICD-10-CM

## 2024-08-27 RX ORDER — INSULIN ASPART 100 [IU]/ML
INJECTION, SOLUTION INTRAVENOUS; SUBCUTANEOUS
Qty: 60 ML | Refills: 5 | Status: SHIPPED | OUTPATIENT
Start: 2024-08-27

## 2024-08-27 RX ORDER — PROCHLORPERAZINE 25 MG/1
SUPPOSITORY RECTAL
Qty: 3 EACH | Refills: 5 | Status: SHIPPED | OUTPATIENT
Start: 2024-08-27

## 2024-11-04 ENCOUNTER — TELEPHONE (OUTPATIENT)
Dept: ENDOCRINOLOGY | Facility: CLINIC | Age: 64
End: 2024-11-04

## 2024-11-04 NOTE — TELEPHONE ENCOUNTER
[facility] received a fax that requires your attention. The document has been indexed to the patient’s chart for your review.      Reason for sending: PRIOR AUTHORIZATION DEXCOM G6 SENSOR    Name of Sender:   [facility]  [address] [place] [address]  TELEPHONE: [phone]  [phone]    Date Indexed: 11-    Notes (if needed): INDEXED UNDER PATIENT CORRESPONDENCE.

## 2024-11-07 ENCOUNTER — OFFICE VISIT (OUTPATIENT)
Dept: ENDOCRINOLOGY | Facility: CLINIC | Age: 64
End: 2024-11-07
Payer: COMMERCIAL

## 2024-11-07 VITALS
SYSTOLIC BLOOD PRESSURE: 129 MMHG | OXYGEN SATURATION: 95 % | BODY MASS INDEX: 41.09 KG/M2 | WEIGHT: 315 LBS | HEART RATE: 92 BPM | DIASTOLIC BLOOD PRESSURE: 76 MMHG

## 2024-11-07 DIAGNOSIS — E13.9 DIABETES MELLITUS TYPE 1.5, MANAGED AS TYPE 2: Primary | ICD-10-CM

## 2024-11-07 RX ORDER — TIRZEPATIDE 7.5 MG/.5ML
7.5 INJECTION, SOLUTION SUBCUTANEOUS WEEKLY
Qty: 2 ML | Refills: 2 | Status: SHIPPED | OUTPATIENT
Start: 2024-11-07

## 2024-11-07 RX ORDER — ERGOCALCIFEROL 1.25 MG/1
50000 CAPSULE, LIQUID FILLED ORAL WEEKLY
COMMUNITY

## 2024-11-07 NOTE — PROGRESS NOTES
Chief Complaint   Patient presents with    Diabetes mellitus type 1.5, managed as type 2        Piotr Kc is a 63 y.o. male had concerns including Diabetes mellitus type 1.5, managed as type 2.    T1DM.    He reports that he has been doing well since his last visit.  He has been tolerating GLP-1 without any overt issues.  He does have Crohn's disease, but reports that his issues are not worsened with the use of this medication.  He has been able to reduce his insulin in his pump and has not been having to take as much.  He has had some weight loss and is happy about this as well.  He denies any overt hyper/hypos since being on this regimen.    Diabetes:  He uses a Dexcom CGM with tandem insulin pump, Mounjaro 5 mg weekly.  Most recent optho exam: Amilcar QUIROZ  Place of optho exam:  Hypos: rarely  Most recent A1c 7.9 (7/2022), 7.5 (01/24/2023), 7.5 (08/3/2023), 7.7 (01/2024)    Current pump settings:  12A: 3.150 u/hr  3A: 2.0 u/hr  10:30P: 1.90 u/hr  CR 1:4  CF 1:30    The following portions of the patient's history were reviewed and updated as appropriate: allergies, current medications, past family history, past medical history, past social history, past surgical history and problem list.      Review of Systems   Constitutional: Negative.    Eyes: Negative.    Endocrine: Negative.    Psychiatric/Behavioral: Negative.     All other systems reviewed and are negative.       Physical Exam  Vitals reviewed.   Constitutional:       Appearance: Normal appearance.   Eyes:      Extraocular Movements: Extraocular movements intact.   Cardiovascular:      Rate and Rhythm: Normal rate.   Pulmonary:      Effort: Pulmonary effort is normal.   Skin:     General: Skin is warm.   Neurological:      General: No focal deficit present.      Mental Status: He is alert and oriented to person, place, and time.   Psychiatric:         Mood and Affect: Mood normal.         Behavior: Behavior normal.         Thought Content: Thought  "content normal.         Judgment: Judgment normal.        /76 (BP Location: Right arm, Patient Position: Sitting, Cuff Size: Small Adult)   Pulse 92   Wt (!) 145 kg (320 lb 3.2 oz)   SpO2 95%   BMI 41.09 kg/m²      Labs and imaging    CMP:  Lab Results   Component Value Date    Glucose 227 (A) 08/03/2023    BUN 16 11/23/2022    BUN/Creatinine Ratio 17.4 11/23/2022    Creatinine 0.92 11/23/2022    Creatinine 0.90 05/11/2022    CO2 20.3 (L) 11/23/2022    Calcium 9.3 11/23/2022    Albumin 3.41 (L) 11/23/2022    AST (SGOT) 18 11/23/2022    ALT (SGPT) 16 11/23/2022     Lipid Panel:  No results found for: \"CHOL\", \"TRIG\", \"HDL\", \"VLDL\", \"LDL\"  HbA1c:  Lab Results   Component Value Date    HGBA1C 7.10 (H) 11/23/2022   7.5 (11/2024)    Glucose:  Lab Results   Component Value Date    POCGLU 227 (A) 08/03/2023     Microalbumin:  No results found for: \"MALBCRERATIO\"  TSH:  No results found for: \"TSH\"    Assessment and plan  Diagnoses and all orders for this visit:    1. Diabetes mellitus type 1.5, managed as type 2 (Primary)  Assessment & Plan:  -Diabetes remains unchanged with A1c 7.5.  -Discussed dietary and exercise guidelines.  -Discussed importance of yearly eye exams.  -Discussed importance of taking insulin as meal times to control PPD values.  Continue using Dexcom CGM with Tandem insulin pump for control of BG's. 2 week data download is as follows:  Very High: 3%  High:24%  In Range:73%  Low: 0%  Very Low:0%  Trend: shows overall regulated BG's with are all minimally above goal.  Adjustments made to current pump settings:  12A: 3.150 u/hr  3A: 2.0 u/hr  10:30P: 1.90 u/hr  CR 1:5  CF 1:30  Discussed not administering as many boluses to prevent hypos.  -Increase Mounjaro 7.5 mg weekly.  Patient has no personal history of pancreatitis, no family history of MEN syndrome or medullary thyroid cancer. Possible side effects including nausea, bloating, other GI upset and rarely pancreatitis were discussed. He was " advised to call the office with any symptoms or concerns.   -S/S hypoglycemia reviewed with Rule of 15's advised.  -Follow-up in 3 months.    Orders:  -     Microalbumin / Creatinine Urine Ratio - Urine, Clean Catch    Other orders  -     Tirzepatide (Mounjaro) 7.5 MG/0.5ML solution auto-injector; Inject 7.5 mg under the skin into the appropriate area as directed 1 (One) Time Per Week.  Dispense: 2 mL; Refill: 2               Return in about 3 months (around 2/7/2025) for A1C, Follow-up appointment. The patient was instructed to contact the clinic with any interval questions or concerns.    This document has been electronically signed by MIGUEL ANGEL Galarza  November 8, 2024 09:44 EST  Endocrinology    Please note that portions of this document were completed with a voice recognition program. Efforts were made to edit the dictations, but occasionally words are mis-transcribed.

## 2024-11-08 NOTE — ASSESSMENT & PLAN NOTE
-Diabetes remains unchanged with A1c 7.5.  -Discussed dietary and exercise guidelines.  -Discussed importance of yearly eye exams.  -Discussed importance of taking insulin as meal times to control PPD values.  Continue using Dexcom CGM with Tandem insulin pump for control of BG's. 2 week data download is as follows:  Very High: 3%  High:24%  In Range:73%  Low: 0%  Very Low:0%  Trend: shows overall regulated BG's with are all minimally above goal.  Adjustments made to current pump settings:  12A: 3.150 u/hr  3A: 2.0 u/hr  10:30P: 1.90 u/hr  CR 1:5  CF 1:30  Discussed not administering as many boluses to prevent hypos.  -Increase Mounjaro 7.5 mg weekly.  Patient has no personal history of pancreatitis, no family history of MEN syndrome or medullary thyroid cancer. Possible side effects including nausea, bloating, other GI upset and rarely pancreatitis were discussed. He was advised to call the office with any symptoms or concerns.   -S/S hypoglycemia reviewed with Rule of 15's advised.  -Follow-up in 3 months.

## 2024-12-19 ENCOUNTER — OFFICE VISIT (OUTPATIENT)
Dept: SURGERY | Facility: CLINIC | Age: 64
End: 2024-12-19
Payer: COMMERCIAL

## 2024-12-19 VITALS — BODY MASS INDEX: 40.43 KG/M2 | WEIGHT: 315 LBS | HEIGHT: 74 IN

## 2024-12-19 DIAGNOSIS — K80.20 GALLSTONES: ICD-10-CM

## 2024-12-19 DIAGNOSIS — K43.9 VENTRAL HERNIA WITHOUT OBSTRUCTION OR GANGRENE: Primary | ICD-10-CM

## 2024-12-19 DIAGNOSIS — E13.9 DIABETES MELLITUS TYPE 1.5, MANAGED AS TYPE 2: ICD-10-CM

## 2024-12-19 DIAGNOSIS — E66.09 OBESITY DUE TO EXCESS CALORIES WITH SERIOUS COMORBIDITY, UNSPECIFIED CLASS: ICD-10-CM

## 2024-12-19 NOTE — PROGRESS NOTES
Radha Kc is a 63 y.o. male.     Chief Complaint: hernia    History of Present Illness He is a obese 64 yo who has had a ventral hernia for several years. He has had prior colon surgery for Crohn's about 20 years ago with a midline incision. A CT in 2022 showed more than one defect with bowel in the hernia. He also had gallstones at that time. He is not a smoker. He is a diabetic and has lost 50 lbs in the last 5 months with his medication. He has some abdominal pains with certain foods and loose stools but a endoscopy recently showed no sign of Crohn's.     The following portions of the patient's history were reviewed and updated as appropriate: current medications, past family history, past medical history, past social history, past surgical history and problem list.    Review of Systems   Constitutional:  Negative for activity change, appetite change, chills, fever and unexpected weight change.   HENT:  Negative for congestion, facial swelling and sore throat.    Eyes:  Negative for photophobia and visual disturbance.   Respiratory:  Negative for chest tightness, shortness of breath and wheezing.    Cardiovascular:  Negative for chest pain, palpitations and leg swelling.   Gastrointestinal:  Positive for abdominal pain. Negative for abdominal distention, anal bleeding, blood in stool, constipation, diarrhea, nausea, rectal pain and vomiting.   Endocrine: Negative for cold intolerance, heat intolerance, polydipsia and polyuria.   Genitourinary:  Negative for difficulty urinating, dysuria, flank pain and urgency.   Musculoskeletal:  Negative for back pain and myalgias.   Skin:  Negative for rash and wound.   Allergic/Immunologic: Negative for immunocompromised state.   Neurological:  Negative for dizziness, seizures, syncope, light-headedness, numbness and headaches.   Hematological:  Negative for adenopathy. Does not bruise/bleed easily.   Psychiatric/Behavioral:  Negative for behavioral  problems and confusion. The patient is not nervous/anxious.        Objective   Physical Exam  Vitals reviewed.   Constitutional:       General: He is not in acute distress.     Appearance: He is well-developed. He is not ill-appearing.      Comments: Moderate sized hernia with at least two fascial defects.   HENT:      Head: Normocephalic. No laceration. Hair is normal.      Right Ear: Hearing and ear canal normal.      Left Ear: Hearing and ear canal normal.      Nose: Nose normal.      Right Sinus: No maxillary sinus tenderness or frontal sinus tenderness.      Left Sinus: No maxillary sinus tenderness or frontal sinus tenderness.   Eyes:      General: Lids are normal.      Conjunctiva/sclera: Conjunctivae normal.      Pupils: Pupils are equal, round, and reactive to light.   Neck:      Thyroid: No thyroid mass or thyromegaly.      Vascular: No JVD.      Trachea: No tracheal tenderness or tracheal deviation.   Cardiovascular:      Rate and Rhythm: Normal rate and regular rhythm.      Heart sounds: No murmur heard.     No gallop.   Pulmonary:      Effort: Pulmonary effort is normal.      Breath sounds: Normal breath sounds. No stridor. No wheezing.   Chest:      Chest wall: No tenderness.   Abdominal:      General: Bowel sounds are normal. There is no distension.      Palpations: Abdomen is soft. There is no mass.      Tenderness: There is no abdominal tenderness. There is no guarding or rebound.      Hernia: A hernia is present.   Musculoskeletal:         General: No deformity.      Cervical back: Normal range of motion.   Lymphadenopathy:      Cervical: No cervical adenopathy.      Upper Body:      Right upper body: No supraclavicular adenopathy.      Left upper body: No supraclavicular adenopathy.   Skin:     General: Skin is warm and dry.      Coloration: Skin is not pale.      Findings: No erythema or rash.   Neurological:      Mental Status: He is alert and oriented to person, place, and time.      Motor: No  abnormal muscle tone.   Psychiatric:         Behavior: Behavior normal.         Thought Content: Thought content normal.         Past Medical History:   Diagnosis Date    Diabetes mellitus     Diabetes mellitus type I     Hypertension     Insulin pump in place     Obesity        Family History   Problem Relation Age of Onset    Hypertension Mother     Heart disease Mother     Atrial fibrillation Mother     Hypertension Father     Diabetes Father     Cancer Sister        Social History     Tobacco Use    Smoking status: Never     Passive exposure: Never    Smokeless tobacco: Never   Vaping Use    Vaping status: Never Used   Substance Use Topics    Alcohol use: Never    Drug use: Never       Past Surgical History:   Procedure Laterality Date    COLON SURGERY      TONSILLECTOMY         Current Outpatient Medications   Medication Instructions    atorvastatin (LIPITOR) 40 mg, Nightly    baclofen (LIORESAL) 10 mg, Daily PRN    Blood Glucose Monitoring Suppl (Accu-Chek Dolores Plus) w/Device kit DEVISE FOR CHECKING BG    Budesonide (ENTOCORT EC) 3 mg, Nightly    Continuous Blood Gluc Transmit (Dexcom G6 Transmitter) misc FOR CHECKING BLOOD SUGAR, CHANGE EVERY 90 DAYS    Continuous Glucose Sensor (Dexcom G6 Sensor) CHANGE EVERY 10 DAYS    esomeprazole (NEXIUM) 40 mg, 2 Times Daily With Meals    glucose blood test strip To test once daily to calibrate CGM.    Insulin Infusion Pump Supplies (AutoSoft XC Infusion Set) misc CHANGE EVERY THREE (3) DAYS    Insulin Infusion Pump Supplies (T:slim X2 3mL Cartridge) misc CHANGE EVERY THREE (3) DAYS    Lancets misc To test once daily to calibrate CGM.    Motegrity 2 MG tablet 1 tablet, Daily    Mounjaro 7.5 mg, Subcutaneous, Weekly    NovoLOG 100 UNIT/ML injection FOR USE IN INSULIN PUMP. MDD: 200 UNITS.    ubrogepant (UBRELVY) 100 mg, As Needed    valsartan-hydrochlorothiazide (DIOVAN-HCT) 320-12.5 MG per tablet 1 tablet, Daily    vitamin D (ERGOCALCIFEROL) 50,000 Units, Weekly          Assessment & Plan   Diagnoses and all orders for this visit:    1. Ventral hernia without obstruction or gangrene (Primary)    2. Diabetes mellitus type 1.5, managed as type 2    3. Obesity due to excess calories with serious comorbidity, unspecified class    4. Gallstones    Check a HIDA and work on weight loss.  Recheck in 2 months.             This document has been electronically signed by Abel Galvin MD   December 19, 2024 13:24 EST

## 2025-01-22 ENCOUNTER — HOSPITAL ENCOUNTER (OUTPATIENT)
Dept: NUCLEAR MEDICINE | Facility: HOSPITAL | Age: 65
Discharge: HOME | End: 2025-01-22
Payer: COMMERCIAL

## 2025-01-22 DIAGNOSIS — E66.09 OBESITY DUE TO EXCESS CALORIES WITH SERIOUS COMORBIDITY, UNSPECIFIED CLASS: ICD-10-CM

## 2025-01-22 DIAGNOSIS — K43.9 VENTRAL HERNIA WITHOUT OBSTRUCTION OR GANGRENE: ICD-10-CM

## 2025-01-22 DIAGNOSIS — K80.20 GALLSTONES: ICD-10-CM

## 2025-01-22 PROCEDURE — A9537 TC99M MEBROFENIN: HCPCS | Performed by: SURGERY

## 2025-01-22 PROCEDURE — 78227 HEPATOBIL SYST IMAGE W/DRUG: CPT | Performed by: RADIOLOGY

## 2025-01-22 PROCEDURE — 25010000002 SINCALIDE PER 5 MCG: Performed by: SURGERY

## 2025-01-22 PROCEDURE — 34310000005 TECHNETIUM TC 99M MEBROFENIN KIT: Performed by: SURGERY

## 2025-01-22 PROCEDURE — 78227 HEPATOBIL SYST IMAGE W/DRUG: CPT

## 2025-01-22 RX ORDER — SINCALIDE 5 UG/5ML
0.04 INJECTION, POWDER, LYOPHILIZED, FOR SOLUTION INTRAVENOUS
Status: COMPLETED | OUTPATIENT
Start: 2025-01-22 | End: 2025-01-22

## 2025-01-22 RX ORDER — KIT FOR THE PREPARATION OF TECHNETIUM TC 99M MEBROFENIN 45 MG/10ML
1 INJECTION, POWDER, LYOPHILIZED, FOR SOLUTION INTRAVENOUS
Status: COMPLETED | OUTPATIENT
Start: 2025-01-22 | End: 2025-01-22

## 2025-01-22 RX ADMIN — SINCALIDE 5.6 MCG: 5 INJECTION, POWDER, LYOPHILIZED, FOR SOLUTION INTRAVENOUS at 08:35

## 2025-01-22 RX ADMIN — MEBROFENIN 1 DOSE: 45 INJECTION, POWDER, LYOPHILIZED, FOR SOLUTION INTRAVENOUS at 07:41

## 2025-01-27 RX ORDER — TIRZEPATIDE 10 MG/.5ML
10 INJECTION, SOLUTION SUBCUTANEOUS WEEKLY
Qty: 2 ML | Refills: 2 | Status: SHIPPED | OUTPATIENT
Start: 2025-01-27

## 2025-01-28 RX ORDER — INSULIN ASPART 100 [IU]/ML
INJECTION, SOLUTION INTRAVENOUS; SUBCUTANEOUS
Qty: 60 ML | Refills: 5 | Status: SHIPPED | OUTPATIENT
Start: 2025-01-28

## 2025-01-30 ENCOUNTER — OFFICE VISIT (OUTPATIENT)
Dept: SURGERY | Facility: CLINIC | Age: 65
End: 2025-01-30
Payer: COMMERCIAL

## 2025-01-30 VITALS — WEIGHT: 315 LBS | HEIGHT: 74 IN | BODY MASS INDEX: 40.43 KG/M2

## 2025-01-30 DIAGNOSIS — K80.20 GALLSTONES: ICD-10-CM

## 2025-01-30 DIAGNOSIS — K82.8 DYSFUNCTIONAL GALLBLADDER: ICD-10-CM

## 2025-01-30 DIAGNOSIS — K43.9 VENTRAL HERNIA WITHOUT OBSTRUCTION OR GANGRENE: ICD-10-CM

## 2025-01-30 DIAGNOSIS — E66.09 OBESITY DUE TO EXCESS CALORIES WITH SERIOUS COMORBIDITY, UNSPECIFIED CLASS: Primary | ICD-10-CM

## 2025-01-30 PROCEDURE — 99213 OFFICE O/P EST LOW 20 MIN: CPT | Performed by: SURGERY

## 2025-01-30 NOTE — PROGRESS NOTES
Radha Kc is a 64 y.o. male.     Chief Complaint: gallstones, hernia    History of Present Illness He is a obese 62 yo who has had a ventral hernia for several years. He has had prior colon surgery for Crohn's about 20 years ago with a midline incision. A CT in 2022 showed more than one defect with bowel in the hernia. He also had gallstones at that time. He is not a smoker. He is a diabetic and has lost 50 lbs in the last 5 months with his medication. He has some abdominal pains with certain foods and loose stools but a endoscopy recently showed no sign of Crohn's. His HIDA EF was 11%. He has lost a couple of pounds since his last visit    The following portions of the patient's history were reviewed and updated as appropriate: current medications, past family history, past medical history, past social history, past surgical history and problem list.    Review of Systems   Constitutional:  Negative for activity change, appetite change, chills, fever and unexpected weight change.   HENT:  Negative for congestion, facial swelling and sore throat.    Eyes:  Negative for photophobia and visual disturbance.   Respiratory:  Negative for chest tightness, shortness of breath and wheezing.    Cardiovascular:  Negative for chest pain, palpitations and leg swelling.   Gastrointestinal:  Positive for abdominal pain and nausea. Negative for abdominal distention, anal bleeding, blood in stool, constipation, diarrhea, rectal pain and vomiting.   Endocrine: Negative for cold intolerance, heat intolerance, polydipsia and polyuria.   Genitourinary:  Negative for difficulty urinating, dysuria, flank pain and urgency.   Musculoskeletal:  Negative for back pain and myalgias.   Skin:  Negative for rash and wound.   Allergic/Immunologic: Negative for immunocompromised state.   Neurological:  Negative for dizziness, seizures, syncope, light-headedness, numbness and headaches.   Hematological:  Negative for  adenopathy. Does not bruise/bleed easily.   Psychiatric/Behavioral:  Negative for behavioral problems and confusion. The patient is not nervous/anxious.      Objective   Physical Exam  Vitals reviewed.   Constitutional:       General: He is not in acute distress.     Appearance: He is well-developed. He is not ill-appearing.      Comments: He has a large abdomen with a large ventral hernia with more than one defect.   HENT:      Head: Normocephalic. No laceration. Hair is normal.      Right Ear: Hearing and ear canal normal.      Left Ear: Hearing and ear canal normal.      Nose: Nose normal.      Right Sinus: No maxillary sinus tenderness or frontal sinus tenderness.      Left Sinus: No maxillary sinus tenderness or frontal sinus tenderness.   Eyes:      General: Lids are normal.      Conjunctiva/sclera: Conjunctivae normal.      Pupils: Pupils are equal, round, and reactive to light.   Neck:      Thyroid: No thyroid mass or thyromegaly.      Vascular: No JVD.      Trachea: No tracheal tenderness or tracheal deviation.   Cardiovascular:      Rate and Rhythm: Normal rate and regular rhythm.      Heart sounds: No murmur heard.     No gallop.   Pulmonary:      Effort: Pulmonary effort is normal.      Breath sounds: Normal breath sounds. No stridor. No wheezing.   Chest:      Chest wall: No tenderness.   Abdominal:      General: Bowel sounds are normal. There is no distension.      Palpations: Abdomen is soft. There is no mass.      Tenderness: There is abdominal tenderness. There is no guarding or rebound.      Hernia: A hernia is present.   Musculoskeletal:         General: No deformity.      Cervical back: Normal range of motion.   Lymphadenopathy:      Cervical: No cervical adenopathy.      Upper Body:      Right upper body: No supraclavicular adenopathy.      Left upper body: No supraclavicular adenopathy.   Skin:     General: Skin is warm and dry.      Coloration: Skin is not pale.      Findings: No erythema or  rash.   Neurological:      Mental Status: He is alert and oriented to person, place, and time.      Motor: No abnormal muscle tone.   Psychiatric:         Behavior: Behavior normal.         Thought Content: Thought content normal.     Past Medical History:   Diagnosis Date   • Diabetes mellitus    • Diabetes mellitus type I    • Hypertension    • Insulin pump in place    • Obesity        Family History   Problem Relation Age of Onset   • Hypertension Mother    • Heart disease Mother    • Atrial fibrillation Mother    • Hypertension Father    • Diabetes Father    • Cancer Sister        Social History     Tobacco Use   • Smoking status: Never     Passive exposure: Never   • Smokeless tobacco: Never   Vaping Use   • Vaping status: Never Used   Substance Use Topics   • Alcohol use: Never   • Drug use: Never       Past Surgical History:   Procedure Laterality Date   • COLON SURGERY     • TONSILLECTOMY         Current Outpatient Medications   Medication Instructions   • atorvastatin (LIPITOR) 40 mg, Nightly   • baclofen (LIORESAL) 10 mg, Daily PRN   • Blood Glucose Monitoring Suppl (Accu-Chek Dolores Plus) w/Device kit DEVISE FOR CHECKING BG   • Budesonide (ENTOCORT EC) 3 mg, Nightly   • Continuous Blood Gluc Transmit (Dexcom G6 Transmitter) misc FOR CHECKING BLOOD SUGAR, CHANGE EVERY 90 DAYS   • Continuous Glucose Sensor (Dexcom G6 Sensor) CHANGE EVERY 10 DAYS   • esomeprazole (NEXIUM) 40 mg, 2 Times Daily With Meals   • glucose blood test strip To test once daily to calibrate CGM.   • Insulin Infusion Pump Supplies (AutoSoft XC Infusion Set) misc CHANGE EVERY THREE (3) DAYS   • Insulin Infusion Pump Supplies (T:slim X2 3mL Cartridge) misc CHANGE EVERY THREE (3) DAYS   • Lancets misc To test once daily to calibrate CGM.   • Motegrity 2 MG tablet 1 tablet, Daily   • Mounjaro 10 mg, Subcutaneous, Weekly   • NovoLOG 100 UNIT/ML injection FOR USE IN INSULIN PUMP. MDD: 200 UNITS.   • ubrogepant (UBRELVY) 100 mg, As Needed   •  valsartan-hydrochlorothiazide (DIOVAN-HCT) 320-12.5 MG per tablet 1 tablet, Daily   • vitamin D (ERGOCALCIFEROL) 50,000 Units, Weekly         Assessment & Plan   Diagnoses and all orders for this visit:    1. Obesity due to excess calories with serious comorbidity, unspecified class (Primary)    2. Ventral hernia without obstruction or gangrene    3. Dysfunctional gallbladder    4. Gallstones    He will have a lap derek first and continue to work on weight loss.             This document has been electronically signed by Abel Galvin MD   January 30, 2025 14:03 EST

## 2025-02-03 NOTE — DISCHARGE INSTRUCTIONS
2/6/25  ARRIVAL TIME PER DR OFFICE    TAKE the following medications the morning of surgery:  All heart or blood pressure medications    HOLD all diabetic medications the morning of surgery as ordered by physician.    Please discontinue all blood thinners and anticoagulants (except aspirin) prior to surgery as per your surgeon and cardiologist instructions.  Aspirin may be continued up to the day prior to surgery.     CHLORHEXIDINE CLOTHS GIVEN WITH INSTRUCTIONS AND FORM TO RETURN TO HOSPITAL, IF APPLICABLE.    General Instructions:  Do not eat or drink after midnight: includes water, mints, or gum. You may brush your teeth.  Dental appliances that are removable must be taken out day of surgery.  Do not smoke, chew tobacco, or drink alcohol.  Bring medications in original bottles, any inhalers and if applicable your C-PAP/BI-PAP machine.  Bring any papers given to you in the doctor's office.  Wear clean comfortable clothes and socks.  Do not wear contact lenses or make-up. Bring a case for your glasses if applicable.  Bring crutches or walker if applicable.  Leave all other valuables and jewelry at home.    If you were given a blood bank ID arm band remember to bring it with you the day of surgery.    Preventing a Surgical Site Infection:  Shower the night before surgery (unless instructed other wise) using a fresh bar of anti-bacterial soap (such as Dial) and clean washcloth. Dry with a clean towel and dress in clean clothing.  For 2 to 3 days before surgery, avoid shaving with a razor near where you will have surgery because the razor can irritate skin and make it easier to develop an infection. Ask your surgeon if you will be receiving antibiotics prior to surgery.  Make sure you, your family, and all healthcare providers clear their hands with soap and water or an alcohol-based hand  before caring for you or your wound.  If at all possible, quit smoking as many days before surgery as you can.    Day of  surgery:  Upon arrival, a Pre-op nurse and Anesthesiologist will review your health history, obtain vital signs, and answer questions you may have. The only belongings needed at this time will be your home medications and if applicable your C-PAP/BI-PAP machine. If you are staying overnight your family can leave the rest of your belongings in the car and bring them to your room later. A Pre-op nurse will start an IV and you may receive medication in preparation for surgery, including something to help you relax. Your family will be able to see you in the Pre-op area. While you are in surgery your family should notify the waiting room  if they leave the waiting room area and provide a contact phone number.    Please be aware that surgery does come with discomfort. We want to make every effort to control your discomfort so please discuss any uncontrolled symptoms with your nurse. Your doctor will most likely have prescribed pain medications.    If you are going home after surgery you will receive individualized written care instructions before being discharged. A responsible adult must drive you to and from the hospital on the day of surgery and stay with you for 24 hours.    If you are staying overnight following surgery, you will be transported to your hospital room following the recovery period.     If you have any questions please call Pre-Admission Testing at [phone] Ext [phone] Monday-Friday 8:00-3:00  Deductibles and co-payments are collected on the day of service. Please be prepared to pay the required co-pay, deductible or deposit on the day of service as defined by your plan.    A RESPONSIBLE PERSON MUST REMAIN IN THE WAITING ROOM DURING YOUR PROCEDURE AND A RESPONSIBLE  MUST BE AVAILABLE UPON YOUR DISCHARGE.

## 2025-02-04 ENCOUNTER — PRE-ADMISSION TESTING (OUTPATIENT)
Dept: PREADMISSION TESTING | Facility: HOSPITAL | Age: 65
End: 2025-02-04
Payer: COMMERCIAL

## 2025-02-04 LAB
ANION GAP SERPL CALCULATED.3IONS-SCNC: 11.3 MMOL/L (ref 5–15)
BUN SERPL-MCNC: 14 MG/DL (ref 8–23)
BUN/CREAT SERPL: 14.7 (ref 7–25)
CALCIUM SPEC-SCNC: 9.6 MG/DL (ref 8.6–10.5)
CHLORIDE SERPL-SCNC: 100 MMOL/L (ref 98–107)
CO2 SERPL-SCNC: 24.7 MMOL/L (ref 22–29)
CREAT SERPL-MCNC: 0.95 MG/DL (ref 0.76–1.27)
DEPRECATED RDW RBC AUTO: 45.1 FL (ref 37–54)
EGFRCR SERPLBLD CKD-EPI 2021: 89.9 ML/MIN/1.73
ERYTHROCYTE [DISTWIDTH] IN BLOOD BY AUTOMATED COUNT: 14.8 % (ref 12.3–15.4)
GLUCOSE SERPL-MCNC: 270 MG/DL (ref 65–99)
HCT VFR BLD AUTO: 45.3 % (ref 37.5–51)
HGB BLD-MCNC: 14.7 G/DL (ref 13–17.7)
MCH RBC QN AUTO: 27.4 PG (ref 26.6–33)
MCHC RBC AUTO-ENTMCNC: 32.5 G/DL (ref 31.5–35.7)
MCV RBC AUTO: 84.4 FL (ref 79–97)
PLATELET # BLD AUTO: 227 10*3/MM3 (ref 140–450)
PMV BLD AUTO: 10.3 FL (ref 6–12)
POTASSIUM SERPL-SCNC: 3.7 MMOL/L (ref 3.5–5.2)
QT INTERVAL: 390 MS
QTC INTERVAL: 479 MS
RBC # BLD AUTO: 5.37 10*6/MM3 (ref 4.14–5.8)
SODIUM SERPL-SCNC: 136 MMOL/L (ref 136–145)
WBC NRBC COR # BLD AUTO: 7.45 10*3/MM3 (ref 3.4–10.8)

## 2025-02-04 PROCEDURE — 36415 COLL VENOUS BLD VENIPUNCTURE: CPT

## 2025-02-04 PROCEDURE — 93005 ELECTROCARDIOGRAM TRACING: CPT

## 2025-02-04 PROCEDURE — 93010 ELECTROCARDIOGRAM REPORT: CPT | Performed by: SPECIALIST

## 2025-02-04 PROCEDURE — 85027 COMPLETE CBC AUTOMATED: CPT

## 2025-02-04 PROCEDURE — 80048 BASIC METABOLIC PNL TOTAL CA: CPT

## 2025-02-06 ENCOUNTER — ANESTHESIA EVENT (OUTPATIENT)
Dept: PERIOP | Facility: HOSPITAL | Age: 65
End: 2025-02-06
Payer: COMMERCIAL

## 2025-02-06 ENCOUNTER — ANESTHESIA (OUTPATIENT)
Dept: PERIOP | Facility: HOSPITAL | Age: 65
End: 2025-02-06
Payer: COMMERCIAL

## 2025-02-06 ENCOUNTER — HOSPITAL ENCOUNTER (OUTPATIENT)
Facility: HOSPITAL | Age: 65
Setting detail: HOSPITAL OUTPATIENT SURGERY
Discharge: HOME | End: 2025-02-06
Attending: SURGERY | Admitting: SURGERY
Payer: COMMERCIAL

## 2025-02-06 VITALS
HEART RATE: 82 BPM | TEMPERATURE: 97.9 F | SYSTOLIC BLOOD PRESSURE: 112 MMHG | OXYGEN SATURATION: 93 % | DIASTOLIC BLOOD PRESSURE: 62 MMHG | RESPIRATION RATE: 16 BRPM | WEIGHT: 315 LBS | BODY MASS INDEX: 40.43 KG/M2 | HEIGHT: 74 IN

## 2025-02-06 DIAGNOSIS — K43.9 VENTRAL HERNIA WITHOUT OBSTRUCTION OR GANGRENE: ICD-10-CM

## 2025-02-06 DIAGNOSIS — K82.8 DYSFUNCTIONAL GALLBLADDER: ICD-10-CM

## 2025-02-06 DIAGNOSIS — K80.20 GALLSTONES: ICD-10-CM

## 2025-02-06 LAB
GLUCOSE BLDC GLUCOMTR-MCNC: 165 MG/DL (ref 70–130)
GLUCOSE BLDC GLUCOMTR-MCNC: 234 MG/DL (ref 70–130)

## 2025-02-06 PROCEDURE — 94799 UNLISTED PULMONARY SVC/PX: CPT

## 2025-02-06 PROCEDURE — 25010000002 MIDAZOLAM PER 1 MG: Performed by: NURSE ANESTHETIST, CERTIFIED REGISTERED

## 2025-02-06 PROCEDURE — 25010000002 DEXAMETHASONE PER 1 MG: Performed by: NURSE ANESTHETIST, CERTIFIED REGISTERED

## 2025-02-06 PROCEDURE — 94640 AIRWAY INHALATION TREATMENT: CPT

## 2025-02-06 PROCEDURE — 25010000002 FENTANYL CITRATE (PF) 50 MCG/ML SOLUTION: Performed by: NURSE ANESTHETIST, CERTIFIED REGISTERED

## 2025-02-06 PROCEDURE — 25010000002 PROPOFOL 200 MG/20ML EMULSION: Performed by: NURSE ANESTHETIST, CERTIFIED REGISTERED

## 2025-02-06 PROCEDURE — 25010000002 LIDOCAINE PF 2% 2 % SOLUTION: Performed by: NURSE ANESTHETIST, CERTIFIED REGISTERED

## 2025-02-06 PROCEDURE — 25010000002 KETOROLAC TROMETHAMINE PER 15 MG: Performed by: NURSE ANESTHETIST, CERTIFIED REGISTERED

## 2025-02-06 PROCEDURE — 47562 LAPAROSCOPIC CHOLECYSTECTOMY: CPT | Performed by: SURGERY

## 2025-02-06 PROCEDURE — 25010000002 GLYCOPYRROLATE 0.4 MG/2ML SOLUTION: Performed by: NURSE ANESTHETIST, CERTIFIED REGISTERED

## 2025-02-06 PROCEDURE — 25010000002 NEOSTIGMINE 10 MG/10ML SOLUTION: Performed by: NURSE ANESTHETIST, CERTIFIED REGISTERED

## 2025-02-06 PROCEDURE — 25010000002 ONDANSETRON PER 1 MG: Performed by: NURSE ANESTHETIST, CERTIFIED REGISTERED

## 2025-02-06 PROCEDURE — 82948 REAGENT STRIP/BLOOD GLUCOSE: CPT

## 2025-02-06 PROCEDURE — 63710000001 INSULIN REGULAR HUMAN PER 5 UNITS: Performed by: NURSE ANESTHETIST, CERTIFIED REGISTERED

## 2025-02-06 DEVICE — LIGACLIP 10-M/L, 10MM ENDOSCOPIC ROTATING MULTIPLE CLIP APPLIERS
Type: IMPLANTABLE DEVICE | Site: ABDOMEN | Status: FUNCTIONAL
Brand: LIGACLIP

## 2025-02-06 RX ORDER — IPRATROPIUM BROMIDE AND ALBUTEROL SULFATE 2.5; .5 MG/3ML; MG/3ML
3 SOLUTION RESPIRATORY (INHALATION) ONCE AS NEEDED
Status: COMPLETED | OUTPATIENT
Start: 2025-02-06 | End: 2025-02-06

## 2025-02-06 RX ORDER — DEXAMETHASONE SODIUM PHOSPHATE 4 MG/ML
INJECTION, SOLUTION INTRA-ARTICULAR; INTRALESIONAL; INTRAMUSCULAR; INTRAVENOUS; SOFT TISSUE AS NEEDED
Status: DISCONTINUED | OUTPATIENT
Start: 2025-02-06 | End: 2025-02-06 | Stop reason: SURG

## 2025-02-06 RX ORDER — FENTANYL CITRATE 50 UG/ML
50 INJECTION, SOLUTION INTRAMUSCULAR; INTRAVENOUS
Status: DISCONTINUED | OUTPATIENT
Start: 2025-02-06 | End: 2025-02-06 | Stop reason: HOSPADM

## 2025-02-06 RX ORDER — HYDROCODONE BITARTRATE AND ACETAMINOPHEN 5; 325 MG/1; MG/1
1-2 TABLET ORAL EVERY 4 HOURS PRN
Qty: 12 TABLET | Refills: 0 | Status: SHIPPED | OUTPATIENT
Start: 2025-02-06

## 2025-02-06 RX ORDER — MAGNESIUM HYDROXIDE 1200 MG/15ML
LIQUID ORAL AS NEEDED
Status: DISCONTINUED | OUTPATIENT
Start: 2025-02-06 | End: 2025-02-06 | Stop reason: HOSPADM

## 2025-02-06 RX ORDER — FENTANYL CITRATE 50 UG/ML
INJECTION, SOLUTION INTRAMUSCULAR; INTRAVENOUS AS NEEDED
Status: DISCONTINUED | OUTPATIENT
Start: 2025-02-06 | End: 2025-02-06 | Stop reason: SURG

## 2025-02-06 RX ORDER — ONDANSETRON 2 MG/ML
INJECTION INTRAMUSCULAR; INTRAVENOUS AS NEEDED
Status: DISCONTINUED | OUTPATIENT
Start: 2025-02-06 | End: 2025-02-06 | Stop reason: SURG

## 2025-02-06 RX ORDER — FAMOTIDINE 10 MG/ML
INJECTION, SOLUTION INTRAVENOUS AS NEEDED
Status: DISCONTINUED | OUTPATIENT
Start: 2025-02-06 | End: 2025-02-06 | Stop reason: SURG

## 2025-02-06 RX ORDER — ROCURONIUM BROMIDE 10 MG/ML
INJECTION, SOLUTION INTRAVENOUS AS NEEDED
Status: DISCONTINUED | OUTPATIENT
Start: 2025-02-06 | End: 2025-02-06 | Stop reason: SURG

## 2025-02-06 RX ORDER — GLYCOPYRROLATE 0.2 MG/ML
INJECTION INTRAMUSCULAR; INTRAVENOUS AS NEEDED
Status: DISCONTINUED | OUTPATIENT
Start: 2025-02-06 | End: 2025-02-06 | Stop reason: SURG

## 2025-02-06 RX ORDER — MEPERIDINE HYDROCHLORIDE 25 MG/ML
12.5 INJECTION INTRAMUSCULAR; INTRAVENOUS; SUBCUTANEOUS
Status: DISCONTINUED | OUTPATIENT
Start: 2025-02-06 | End: 2025-02-06 | Stop reason: HOSPADM

## 2025-02-06 RX ORDER — OXYCODONE AND ACETAMINOPHEN 5; 325 MG/1; MG/1
1 TABLET ORAL ONCE AS NEEDED
Status: COMPLETED | OUTPATIENT
Start: 2025-02-06 | End: 2025-02-06

## 2025-02-06 RX ORDER — PROPOFOL 10 MG/ML
INJECTION, EMULSION INTRAVENOUS AS NEEDED
Status: DISCONTINUED | OUTPATIENT
Start: 2025-02-06 | End: 2025-02-06 | Stop reason: SURG

## 2025-02-06 RX ORDER — NEOSTIGMINE METHYLSULFATE 1 MG/ML
INJECTION INTRAVENOUS AS NEEDED
Status: DISCONTINUED | OUTPATIENT
Start: 2025-02-06 | End: 2025-02-06 | Stop reason: SURG

## 2025-02-06 RX ORDER — EPHEDRINE SULFATE 5 MG/ML
INJECTION INTRAVENOUS AS NEEDED
Status: DISCONTINUED | OUTPATIENT
Start: 2025-02-06 | End: 2025-02-06 | Stop reason: SURG

## 2025-02-06 RX ORDER — MIDAZOLAM HYDROCHLORIDE 1 MG/ML
INJECTION, SOLUTION INTRAMUSCULAR; INTRAVENOUS AS NEEDED
Status: DISCONTINUED | OUTPATIENT
Start: 2025-02-06 | End: 2025-02-06 | Stop reason: SURG

## 2025-02-06 RX ORDER — ONDANSETRON 2 MG/ML
4 INJECTION INTRAMUSCULAR; INTRAVENOUS AS NEEDED
Status: DISCONTINUED | OUTPATIENT
Start: 2025-02-06 | End: 2025-02-06 | Stop reason: HOSPADM

## 2025-02-06 RX ORDER — KETOROLAC TROMETHAMINE 30 MG/ML
INJECTION, SOLUTION INTRAMUSCULAR; INTRAVENOUS AS NEEDED
Status: DISCONTINUED | OUTPATIENT
Start: 2025-02-06 | End: 2025-02-06 | Stop reason: SURG

## 2025-02-06 RX ORDER — LIDOCAINE HYDROCHLORIDE 20 MG/ML
INJECTION, SOLUTION EPIDURAL; INFILTRATION; INTRACAUDAL; PERINEURAL AS NEEDED
Status: DISCONTINUED | OUTPATIENT
Start: 2025-02-06 | End: 2025-02-06 | Stop reason: SURG

## 2025-02-06 RX ADMIN — LIDOCAINE HYDROCHLORIDE 100 MG: 20 INJECTION, SOLUTION EPIDURAL; INFILTRATION; INTRACAUDAL; PERINEURAL at 09:56

## 2025-02-06 RX ADMIN — FENTANYL CITRATE 100 MCG: 50 INJECTION INTRAMUSCULAR; INTRAVENOUS at 09:50

## 2025-02-06 RX ADMIN — KETOROLAC TROMETHAMINE 30 MG: 30 INJECTION, SOLUTION INTRAMUSCULAR; INTRAVENOUS at 10:13

## 2025-02-06 RX ADMIN — NEOSTIGMINE 2.5 MG: 1 INJECTION INTRAVENOUS at 10:29

## 2025-02-06 RX ADMIN — IPRATROPIUM BROMIDE AND ALBUTEROL SULFATE 3 ML: .5; 2.5 SOLUTION RESPIRATORY (INHALATION) at 10:56

## 2025-02-06 RX ADMIN — FAMOTIDINE 20 MG: 10 INJECTION, SOLUTION INTRAVENOUS at 09:50

## 2025-02-06 RX ADMIN — ONDANSETRON 4 MG: 2 INJECTION INTRAMUSCULAR; INTRAVENOUS at 09:50

## 2025-02-06 RX ADMIN — ROCURONIUM BROMIDE 40 MG: 10 SOLUTION INTRAVENOUS at 09:56

## 2025-02-06 RX ADMIN — EPHEDRINE SULFATE 10 MG: 5 INJECTION INTRAVENOUS at 10:01

## 2025-02-06 RX ADMIN — DEXAMETHASONE SODIUM PHOSPHATE 4 MG: 4 INJECTION, SOLUTION INTRA-ARTICULAR; INTRALESIONAL; INTRAMUSCULAR; INTRAVENOUS; SOFT TISSUE at 09:56

## 2025-02-06 RX ADMIN — MIDAZOLAM HYDROCHLORIDE 2 MG: 1 INJECTION, SOLUTION INTRAMUSCULAR; INTRAVENOUS at 09:50

## 2025-02-06 RX ADMIN — GLYCOPYRROLATE 0.4 MG: 0.2 INJECTION INTRAMUSCULAR; INTRAVENOUS at 10:29

## 2025-02-06 RX ADMIN — OXYCODONE HYDROCHLORIDE AND ACETAMINOPHEN 1 TABLET: 5; 325 TABLET ORAL at 11:04

## 2025-02-06 RX ADMIN — INSULIN HUMAN 5 UNITS: 100 INJECTION, SOLUTION PARENTERAL at 09:50

## 2025-02-06 RX ADMIN — PROPOFOL 200 MG: 10 INJECTION, EMULSION INTRAVENOUS at 09:56

## 2025-02-06 RX ADMIN — EPHEDRINE SULFATE 15 MG: 5 INJECTION INTRAVENOUS at 10:03

## 2025-02-06 NOTE — ANESTHESIA PROCEDURE NOTES
Airway  Urgency: elective    Date/Time: 2/6/2025 9:56 AM  Airway not difficult    General Information and Staff    Patient location during procedure: OR  CRNA/CAA: Daily Hines CRNA    Indications and Patient Condition  Indications for airway management: airway protection    Preoxygenated: yes  MILS not maintained throughout  Mask difficulty assessment: 0 - not attempted    Final Airway Details  Final airway type: endotracheal airway      Successful airway: ETT  Cuffed: yes   Successful intubation technique: direct laryngoscopy  Endotracheal tube insertion site: oral  Blade: Kathleen  Blade size: 4  ETT size (mm): 7.5  Cormack-Lehane Classification: grade I - full view of glottis  Placement verified by: chest auscultation, capnometry and palpation of cuff   Measured from: lips  ETT/EBT  to lips (cm): 22  Number of attempts at approach: 1  Assessment: lips, teeth, and gum same as pre-op and atraumatic intubation    Additional Comments  Atraumatic ETT placement, dentition unchanged.

## 2025-02-06 NOTE — ANESTHESIA PREPROCEDURE EVALUATION
Anesthesia Evaluation     Patient summary reviewed and Nursing notes reviewed   no history of anesthetic complications:                Airway   Mallampati: I  TM distance: >3 FB  Neck ROM: full  No difficulty expected  Dental - normal exam     Pulmonary - negative pulmonary ROS and normal exam   Cardiovascular - normal exam  Exercise tolerance: good (4-7 METS)    NYHA Classification: II    (+) hypertension      Neuro/Psych- negative ROS  GI/Hepatic/Renal/Endo    (+) obesity, morbid obesity, diabetes mellitus    Musculoskeletal (-) negative ROS    Abdominal  - normal exam    Bowel sounds: normal.   Substance History - negative use     OB/GYN negative ob/gyn ROS         Other - negative ROS                     Anesthesia Plan    ASA 3     general     intravenous induction     Anesthetic plan, risks, benefits, and alternatives have been provided, discussed and informed consent has been obtained with: patient.    CODE STATUS:

## 2025-02-06 NOTE — ANESTHESIA POSTPROCEDURE EVALUATION
Patient: Piotr Kc    Procedure Summary       Date: 02/06/25 Room / Location:  [facility] OR 01 / [facility] OR    Anesthesia Start: 0950 Anesthesia Stop: 1037    Procedure: CHOLECYSTECTOMY LAPAROSCOPIC (Abdomen) Diagnosis:       Ventral hernia without obstruction or gangrene      Dysfunctional gallbladder      Gallstones      (Ventral hernia without obstruction or gangrene [K43.9])      (Dysfunctional gallbladder [K82.8])      (Gallstones [K80.20])    Surgeons: Abel Galvin MD Provider: Jose Hartman DO    Anesthesia Type: general ASA Status: 3            Anesthesia Type: general    Vitals  Vitals Value Taken Time   /57 02/06/25 1109   Temp 97 °F (36.1 °C) 02/06/25 1039   Pulse 83 02/06/25 1112   Resp 14 02/06/25 1109   SpO2 91 % 02/06/25 1112   Vitals shown include unfiled device data.        Post Anesthesia Care and Evaluation    Patient location during evaluation: PHASE II  Patient participation: complete - patient participated  Level of consciousness: awake and alert  Pain score: 1  Pain management: adequate    Airway patency: patent  Anesthetic complications: No anesthetic complications  PONV Status: controlled  Cardiovascular status: acceptable  Respiratory status: acceptable  Hydration status: acceptable

## 2025-02-06 NOTE — OP NOTE
CHOLECYSTECTOMY LAPAROSCOPIC  Procedure Note    Piotr WYNN Hendricks  2/6/2025    Pre-op Diagnosis:   Ventral hernia without obstruction or gangrene [K43.9]  Dysfunctional gallbladder [K82.8]  Gallstones [K80.20]    Post-op Diagnosis: same, fatty liver, hernia        Procedure(s):  CHOLECYSTECTOMY LAPAROSCOPIC    Surgeon(s):  Abel Galvin MD    Anesthesia: General    Staff:   Circulator: Anny Benites RN  Scrub Person: Ralph Johansen  Assistant: Abel Kebede    Estimated Blood Loss: minimal    Specimens:                Order Name Source Comment Collection Info Order Time   TISSUE EXAM, [facility] ([facility]) Gallbladder  Collected By: Abel Galvin MD 2/6/2025 10:11 AM     Release to patient   Routine Release              Drains:   [REMOVED] NG/OG Tube Orogastric 18 Fr Center mouth (Removed)       Procedure: The abdomen was prepped and draped. The RUQ port was placed first due to his large midline hernia. A upper midline port was placed and the the RUQ was free of adhesions. The adhesions were mostly on the left side and lower abdomen. Another 5 mm port was placed just right of the midline in the mid epigastrium. The liver is enlarged and fatty. The gallbladder had a lot of fat around it. This was dissected off and a lot of the gallbladder was hanging down over the cystic duct area. The gallbladder was mobilized partially off the liver so the cystic duct and artery could be clearly identified, clipped and divided. The gallbladder was then taken off the liver with cautery and brought out the upper midline port. It had multiple smaller stones.     Findings: cholelithiasis, fatty liver, ventral hernia           Complications: none   Grafts / Implants N/A    Abel Galvin MD     Date: 2/6/2025  Time: 10:26 EST

## 2025-02-11 LAB — REF LAB TEST METHOD: NORMAL

## 2025-02-13 ENCOUNTER — OFFICE VISIT (OUTPATIENT)
Dept: SURGERY | Facility: CLINIC | Age: 65
End: 2025-02-13
Payer: COMMERCIAL

## 2025-02-13 VITALS — WEIGHT: 315 LBS | HEIGHT: 74 IN | BODY MASS INDEX: 40.43 KG/M2

## 2025-02-13 DIAGNOSIS — Z90.49 STATUS POST LAPAROSCOPIC CHOLECYSTECTOMY: Primary | ICD-10-CM

## 2025-02-13 PROCEDURE — 99024 POSTOP FOLLOW-UP VISIT: CPT | Performed by: SURGERY

## 2025-02-13 NOTE — PROGRESS NOTES
Radha Kc is a 64 y.o. male.     Chief Complaint: post lap derek    History of Present Illness He is a obese 65 yo who had a lap derek last week. He also has a large ventral hernia from prior Crohns surgeries. He is doing well and his stomach feels better. He has now lost about 60 lbs over the last 6 months but is still over 300 lbs. He is on monjaro.    The following portions of the patient's history were reviewed and updated as appropriate: current medications, past family history, past medical history, past social history, past surgical history and problem list.    Review of Systems    Objective   Physical Exam wounds ok. His hernia is very large with multiple defects    Past Medical History:   Diagnosis Date    Crohn's colitis     Diabetes mellitus     Diabetes mellitus type I     Hernia of fascia     Hypertension     Insulin pump in place     Obesity        Family History   Problem Relation Age of Onset    Hypertension Mother     Heart disease Mother     Atrial fibrillation Mother     Hypertension Father     Diabetes Father     Cancer Sister        Social History     Tobacco Use    Smoking status: Never     Passive exposure: Never    Smokeless tobacco: Never   Vaping Use    Vaping status: Never Used   Substance Use Topics    Alcohol use: Never    Drug use: Never       Past Surgical History:   Procedure Laterality Date    CHOLECYSTECTOMY N/A 2/6/2025    Procedure: CHOLECYSTECTOMY LAPAROSCOPIC;  Surgeon: Abel Galvin MD;  Location: [facility];  Service: General;  Laterality: N/A;    COLON SURGERY      COLONOSCOPY      ENDOSCOPY      TONSILLECTOMY         Current Outpatient Medications   Medication Instructions    atorvastatin (LIPITOR) 40 mg, Nightly    baclofen (LIORESAL) 10 mg, Daily PRN    Blood Glucose Monitoring Suppl (Accu-Chek Dolores Plus) w/Device kit DEVISE FOR CHECKING BG    Budesonide (ENTOCORT EC) 3 mg, Nightly    Continuous Blood Gluc Transmit (Dexcom G6 Transmitter) misc FOR  CHECKING BLOOD SUGAR, CHANGE EVERY 90 DAYS    Continuous Glucose Sensor (Dexcom G6 Sensor) CHANGE EVERY 10 DAYS    esomeprazole (NEXIUM) 40 mg, 2 Times Daily With Meals    glucose blood test strip To test once daily to calibrate CGM.    HYDROcodone-acetaminophen (NORCO) 5-325 MG per tablet Take 1-2 tablets by mouth Every 4 (Four) Hours As Needed for Moderate Pain.    Insulin Infusion Pump Supplies (AutoSoft XC Infusion Set) misc CHANGE EVERY THREE (3) DAYS    Insulin Infusion Pump Supplies (T:slim X2 3mL Cartridge) misc CHANGE EVERY THREE (3) DAYS    Lancets misc To test once daily to calibrate CGM.    Motegrity 2 MG tablet 1 tablet, Daily    Mounjaro 10 mg, Subcutaneous, Weekly    NovoLOG 100 UNIT/ML injection FOR USE IN INSULIN PUMP. MDD: 200 UNITS.    ubrogepant (UBRELVY) 100 mg, As Needed    valsartan-hydrochlorothiazide (DIOVAN-HCT) 320-12.5 MG per tablet 1 tablet, Daily    vitamin D (ERGOCALCIFEROL) 50,000 Units, Weekly         Assessment & Plan   Diagnoses and all orders for this visit:    1. Status post laparoscopic cholecystectomy (Primary)    Return in 2 months and continue working on the weight loss.             This document has been electronically signed by Abel Galvin MD   February 13, 2025 15:10 EST

## 2025-02-25 RX ORDER — PROCHLORPERAZINE 25 MG/1
SUPPOSITORY RECTAL
Qty: 1 EACH | Refills: 4 | Status: SHIPPED | OUTPATIENT
Start: 2025-02-25

## 2025-03-26 RX ORDER — PROCHLORPERAZINE 25 MG/1
SUPPOSITORY RECTAL
Qty: 3 EACH | Refills: 6 | Status: SHIPPED | OUTPATIENT
Start: 2025-03-26

## 2025-04-10 ENCOUNTER — OFFICE VISIT (OUTPATIENT)
Dept: ENDOCRINOLOGY | Facility: CLINIC | Age: 65
End: 2025-04-10
Payer: COMMERCIAL

## 2025-04-10 VITALS
WEIGHT: 293.6 LBS | SYSTOLIC BLOOD PRESSURE: 110 MMHG | OXYGEN SATURATION: 95 % | BODY MASS INDEX: 37.7 KG/M2 | HEART RATE: 89 BPM | DIASTOLIC BLOOD PRESSURE: 52 MMHG

## 2025-04-10 DIAGNOSIS — E13.9 DIABETES MELLITUS TYPE 1.5, MANAGED AS TYPE 2: Primary | ICD-10-CM

## 2025-04-10 DIAGNOSIS — E10.649 TYPE 1 DIABETES MELLITUS WITH HYPOGLYCEMIA AND WITHOUT COMA: ICD-10-CM

## 2025-04-10 LAB
EXPIRATION DATE: ABNORMAL
HBA1C MFR BLD: 6.6 % (ref 4.5–5.7)
Lab: ABNORMAL

## 2025-04-10 PROCEDURE — 84439 ASSAY OF FREE THYROXINE: CPT | Performed by: NURSE PRACTITIONER

## 2025-04-10 PROCEDURE — 84443 ASSAY THYROID STIM HORMONE: CPT | Performed by: NURSE PRACTITIONER

## 2025-04-10 PROCEDURE — 80061 LIPID PANEL: CPT | Performed by: NURSE PRACTITIONER

## 2025-04-10 PROCEDURE — 80053 COMPREHEN METABOLIC PANEL: CPT | Performed by: NURSE PRACTITIONER

## 2025-04-10 RX ORDER — TIRZEPATIDE 12.5 MG/.5ML
12.5 INJECTION, SOLUTION SUBCUTANEOUS WEEKLY
Qty: 2 ML | Refills: 2 | Status: SHIPPED | OUTPATIENT
Start: 2025-04-10

## 2025-04-10 NOTE — ASSESSMENT & PLAN NOTE
-Diabetes at goal with A1c down from 7.5 to 6.6.  -Discussed dietary and exercise guidelines.  -Discussed importance of yearly eye exams.  -Discussed importance of taking insulin as meal times to control PPD values.  Continue using Dexcom CGM with Tandem insulin pump for control of BG's. 2 week data download is as follows:  Very High: 2.2%  High:22%  In Range:76%  Low: 0.2%  Very Low:0%  Trend: shows overall regulated BG's with some mild post meal hypers.  Continue with current insulin pump settings:  12A: 1.200 u/hr  3A: 1.7 u/hr  6A: 1.600 u/hr  3:30P: 1.20 u/hr  9P: 0.80 u/hr  10:30P: 1.30 u/hr  CR 1:5/6 depending on the day  CF 1:30  -Increase Mounjaro 12.5 mg weekly.  Patient has no personal history of pancreatitis, no family history of MEN syndrome or medullary thyroid cancer. Possible side effects including nausea, bloating, other GI upset and rarely pancreatitis were discussed. He was advised to call the office with any symptoms or concerns.   -Discussed that as his GLP-1 is increased, he will need to monitor for hypos and continue adjusting and reducing his insulin requirements.   -Discussed Glucagon and appropriate time of use.   -S/S hypoglycemia reviewed with Rule of 15's advised.  -Follow-up in 3 months.

## 2025-04-10 NOTE — PROGRESS NOTES
Chief Complaint   Patient presents with    Diabetes     F/u        Piotr Kc is a 64 y.o. male had concerns including Diabetes (F/u).    T1DM.    He reports that he has been doing well since his last visit.  He has been tolerating GLP-1 without any overt issues.  He has been adjusting his insulin requirements in his pump to reduce developing hypos and has been doing well with this.  He is at a standstill on his weight loss and does not feel like the 10 mg dose is helping with this at all.  He is asking about increasing to a higher dose.      Diabetes:  He uses a Dexcom CGM with tandem insulin pump, Mounjaro 10 mg weekly.  Most recent optho exam: [facility] QU[facility]OZ  Place of optho exam:  Hypos: rarely  Most recent A1c 7.9 (7/2022), 7.5 (01/24/2023), 7.5 (08/3/2023), 7.7 (01/2024)    Current pump settings:  12A: 1.200 u/hr  3A: 1.7 u/hr  6A: 1.600 u/hr  3:30P: 1.20 u/hr  9P: 0.80 u/hr  10:30P: 1.30 u/hr  CR 1:5/6 depending on the day  CF 1:30    The following portions of the patient's history were reviewed and updated as appropriate: allergies, current medications, past family history, past medical history, past social history, past surgical history and problem list.      Review of Systems   Constitutional: Negative.    Eyes: Negative.    Endocrine: Negative.    Psychiatric/Behavioral: Negative.     All other systems reviewed and are negative.       Physical Exam  Vitals reviewed.   Constitutional:       Appearance: Normal appearance.   Eyes:      Extraocular Movements: Extraocular movements intact.   Cardiovascular:      Rate and Rhythm: Normal rate.   Pulmonary:      Effort: Pulmonary effort is normal.   Skin:     General: Skin is warm.   Neurological:      General: No focal deficit present.      Mental Status: He is alert and oriented to person, place, and time.   Psychiatric:         Mood and Affect: Mood normal.         Behavior: Behavior normal.         Thought Content: Thought content normal.          "Judgment: Judgment normal.        /52 (BP Location: Left arm, Patient Position: Sitting, Cuff Size: Adult)   Pulse 89   Wt 133 kg (293 lb 9.6 oz)   SpO2 95%   BMI 37.70 kg/m²      Labs and imaging    CMP:  Lab Results   Component Value Date    Glucose 165 (H) 02/06/2025    BUN 14 02/04/2025    BUN/Creatinine Ratio 14.7 02/04/2025    Creatinine 0.95 02/04/2025    Creatinine 0.90 05/11/2022    CO2 24.7 02/04/2025    Calcium 9.6 02/04/2025    Albumin 3.41 (L) 11/23/2022    AST (SGOT) 18 11/23/2022    ALT (SGPT) 16 11/23/2022     Lipid Panel:  No results found for: \"CHOL\", \"TRIG\", \"HDL\", \"VLDL\", \"LDL\"  HbA1c:  Lab Results   Component Value Date    HGBA1C 6.6 (A) 04/10/2025   7.5 (11/2024)    Glucose:  Lab Results   Component Value Date    POCGLU 165 (H) 02/06/2025     Microalbumin:  No results found for: \"MALBCRERATIO\"  TSH:  No results found for: \"TSH\"    Assessment and plan  Diagnoses and all orders for this visit:    1. Diabetes mellitus type 1.5, managed as type 2 (Primary)  Assessment & Plan:  -Diabetes at goal with A1c down from 7.5 to 6.6.  -Discussed dietary and exercise guidelines.  -Discussed importance of yearly eye exams.  -Discussed importance of taking insulin as meal times to control PPD values.  Continue using Dexcom CGM with Tandem insulin pump for control of BG's. 2 week data download is as follows:  Very High: 2.2%  High:22%  In Range:76%  Low: 0.2%  Very Low:0%  Trend: shows overall regulated BG's with some mild post meal hypers.  Continue with current insulin pump settings:  12A: 1.200 u/hr  3A: 1.7 u/hr  6A: 1.600 u/hr  3:30P: 1.20 u/hr  9P: 0.80 u/hr  10:30P: 1.30 u/hr  CR 1:5/6 depending on the day  CF 1:30  -Increase Mounjaro 12.5 mg weekly.  Patient has no personal history of pancreatitis, no family history of MEN syndrome or medullary thyroid cancer. Possible side effects including nausea, bloating, other GI upset and rarely pancreatitis were discussed. He was advised to call the " office with any symptoms or concerns.   -Discussed that as his GLP-1 is increased, he will need to monitor for hypos and continue adjusting and reducing his insulin requirements.   -Discussed Glucagon and appropriate time of use.   -S/S hypoglycemia reviewed with Rule of 15's advised.  -Follow-up in 3 months.    Orders:  -     POC Glycosylated Hemoglobin (Hb A1C)  -     Tirzepatide (Mounjaro) 12.5 MG/0.5ML solution auto-injector; Inject 0.5 mL under the skin into the appropriate area as directed 1 (One) Time Per Week.  Dispense: 2 mL; Refill: 2  -     Comprehensive Metabolic Panel  -     Lipid Panel  -     Microalbumin / Creatinine Urine Ratio - Urine, Clean Catch  -     TSH  -     T4, free        Return in about 3 months (around 7/10/2025) for Follow-up appointment, A1C. The patient was instructed to contact the clinic with any interval questions or concerns.    This document has been electronically signed by MIGUEL ANGEL Galarza  April 10, 2025 15:55 EDT  Endocrinology    Please note that portions of this document were completed with a voice recognition program. Efforts were made to edit the dictations, but occasionally words are mis-transcribed.

## 2025-04-11 LAB
ALBUMIN SERPL-MCNC: 4.2 G/DL (ref 3.5–5.2)
ALBUMIN/GLOB SERPL: 1.5 G/DL
ALP SERPL-CCNC: 80 U/L (ref 39–117)
ALT SERPL W P-5'-P-CCNC: 21 U/L (ref 1–41)
ANION GAP SERPL CALCULATED.3IONS-SCNC: 14.2 MMOL/L (ref 5–15)
AST SERPL-CCNC: 16 U/L (ref 1–40)
BILIRUB SERPL-MCNC: 1.1 MG/DL (ref 0–1.2)
BUN SERPL-MCNC: 15 MG/DL (ref 8–23)
BUN/CREAT SERPL: 14.4 (ref 7–25)
CALCIUM SPEC-SCNC: 10.2 MG/DL (ref 8.6–10.5)
CHLORIDE SERPL-SCNC: 103 MMOL/L (ref 98–107)
CHOLEST SERPL-MCNC: 128 MG/DL (ref 0–200)
CO2 SERPL-SCNC: 27.8 MMOL/L (ref 22–29)
CREAT SERPL-MCNC: 1.04 MG/DL (ref 0.76–1.27)
EGFRCR SERPLBLD CKD-EPI 2021: 80.7 ML/MIN/1.73
GLOBULIN UR ELPH-MCNC: 2.8 GM/DL
GLUCOSE SERPL-MCNC: 140 MG/DL (ref 65–99)
HDLC SERPL-MCNC: 56 MG/DL (ref 40–60)
LDLC SERPL CALC-MCNC: 58 MG/DL (ref 0–100)
LDLC/HDLC SERPL: 1.05 {RATIO}
POTASSIUM SERPL-SCNC: 3.7 MMOL/L (ref 3.5–5.2)
PROT SERPL-MCNC: 7 G/DL (ref 6–8.5)
SODIUM SERPL-SCNC: 145 MMOL/L (ref 136–145)
T4 FREE SERPL-MCNC: 1.47 NG/DL (ref 0.92–1.68)
TRIGL SERPL-MCNC: 67 MG/DL (ref 0–150)
TSH SERPL DL<=0.05 MIU/L-ACNC: 0.95 UIU/ML (ref 0.27–4.2)
VLDLC SERPL-MCNC: 14 MG/DL (ref 5–40)

## 2025-04-17 ENCOUNTER — OFFICE VISIT (OUTPATIENT)
Dept: SURGERY | Facility: CLINIC | Age: 65
End: 2025-04-17
Payer: COMMERCIAL

## 2025-04-17 VITALS — BODY MASS INDEX: 37.22 KG/M2 | HEIGHT: 74 IN | WEIGHT: 290 LBS

## 2025-04-17 DIAGNOSIS — K43.9 VENTRAL HERNIA WITHOUT OBSTRUCTION OR GANGRENE: Primary | ICD-10-CM

## 2025-04-17 DIAGNOSIS — E66.09 OBESITY DUE TO EXCESS CALORIES WITH SERIOUS COMORBIDITY, UNSPECIFIED CLASS: ICD-10-CM

## 2025-04-17 RX ORDER — COLESEVELAM 180 1/1
TABLET ORAL DAILY
COMMUNITY
Start: 2025-04-14

## 2025-04-17 NOTE — PROGRESS NOTES
Radha Kc is a 64 y.o. male.     Chief Complaint: ventral hernia with obesity    History of Present Illness  He is a obese 65 yo who had a lap derek last week. He also has a large ventral hernia from prior Crohns surgeries. He is doing well and his stomach feels better. He has now lost about 60 lbs over the last year but is still over 300 lbs and has not lost any more the last few months. He is on monjaro. He has several defects and has poor muscle tone in the abdominal wall with a large abdomen.    The following portions of the patient's history were reviewed and updated as appropriate: current medications, past family history, past medical history, past social history, past surgical history and problem list.    Review of Systems    Objective   Physical Exam the hernia is large, soft, and multilobulated with no tenderness    Past Medical History:   Diagnosis Date    Cholelithiasis     Gallbladder was Removed by Dr Galvin    Colon polyp 2021    Crohn's colitis     Diabetes mellitus 12/1982    Type 1    Diabetes mellitus type I     Hernia of fascia     Hypertension     Insulin pump in place     Obesity        Family History   Problem Relation Age of Onset    Hypertension Mother     Heart disease Mother     Atrial fibrillation Mother     Hypertension Father     Diabetes Father         Type 2    Cancer Sister     Cancer Sister         Breast Cancer       Social History     Tobacco Use    Smoking status: Never     Passive exposure: Never    Smokeless tobacco: Never   Vaping Use    Vaping status: Never Used   Substance Use Topics    Alcohol use: Never    Drug use: Never       Past Surgical History:   Procedure Laterality Date    CHOLECYSTECTOMY N/A 2/6/2025    Procedure: CHOLECYSTECTOMY LAPAROSCOPIC;  Surgeon: Abel Galvin MD;  Location: [facility];  Service: General;  Laterality: N/A;    COLON SURGERY      COLONOSCOPY      ENDOSCOPY      TONSILLECTOMY         Current Outpatient Medications    Medication Instructions    atorvastatin (LIPITOR) 40 mg, Nightly    baclofen (LIORESAL) 10 mg, Daily PRN    Blood Glucose Monitoring Suppl (Accu-Chek Dolores Plus) w/Device kit DEVISE FOR CHECKING BG    Budesonide (ENTOCORT EC) 3 mg, Nightly    colesevelam (WELCHOL) 625 MG tablet Daily    Continuous Glucose Sensor (Dexcom G6 Sensor) CHANGE EVERY 10 DAYS AS DIRECTED    Continuous Glucose Transmitter (Dexcom G6 Transmitter) misc FOR CHECKING BLOOD SUGAR, CHANGE EVERY 90 DAYS    esomeprazole (NEXIUM) 40 mg, 2 Times Daily With Meals    glucose blood test strip To test once daily to calibrate CGM.    HYDROcodone-acetaminophen (NORCO) 5-325 MG per tablet Take 1-2 tablets by mouth Every 4 (Four) Hours As Needed for Moderate Pain.    Insulin Infusion Pump Supplies (AutoSoft XC Infusion Set) misc CHANGE EVERY THREE (3) DAYS    Insulin Infusion Pump Supplies (T:slim X2 3mL Cartridge) misc CHANGE EVERY THREE (3) DAYS    Lancets misc To test once daily to calibrate CGM.    Motegrity 2 MG tablet 1 tablet, Daily    Mounjaro 12.5 mg, Subcutaneous, Weekly    NovoLOG 100 UNIT/ML injection FOR USE IN INSULIN PUMP. MDD: 200 UNITS.    ubrogepant (UBRELVY) 100 mg, As Needed    valsartan-hydrochlorothiazide (DIOVAN-HCT) 320-12.5 MG per tablet 1 tablet, Daily    vitamin D (ERGOCALCIFEROL) 50,000 Units, Weekly         Assessment & Plan   Diagnoses and all orders for this visit:    1. Ventral hernia without obstruction or gangrene (Primary)    2. Obesity due to excess calories with serious comorbidity, unspecified class    He is going to work more on weight loss and will see Dr Talley about possible repair.              This document has been electronically signed by Abel Galvin MD   April 17, 2025 15:19 EDT

## 2025-04-24 ENCOUNTER — OFFICE VISIT (OUTPATIENT)
Age: 65
End: 2025-04-24
Payer: COMMERCIAL

## 2025-04-24 VITALS — WEIGHT: 290 LBS | HEIGHT: 74 IN | BODY MASS INDEX: 37.22 KG/M2

## 2025-04-24 DIAGNOSIS — K43.9 VENTRAL HERNIA WITHOUT OBSTRUCTION OR GANGRENE: Primary | ICD-10-CM

## 2025-04-28 NOTE — PROGRESS NOTES
Date of Service: 4/28/2025    Subjective   Piotr Kc is a 64 y.o. male is being in consultation today at the request of Kiel Kline MD    Chief Complaint: Ventral hernia    Piotr Kc is a 64 y.o. male who presents with a ventral hernia.  He has a history of a bowel resection secondary to Crohn's disease as well as a cholecystectomy.  He presents with a large ventral incisional hernia.    Past Medical History:   Diagnosis Date    Cholelithiasis     Gallbladder was Removed by Dr Galvin    Colon polyp 2021    Crohn's colitis     Diabetes mellitus 12/1982    Type 1    Diabetes mellitus type I     Hernia of fascia     Hypertension     Insulin pump in place     Obesity        Past Surgical History:   Procedure Laterality Date    CHOLECYSTECTOMY N/A 2/6/2025    Procedure: CHOLECYSTECTOMY LAPAROSCOPIC;  Surgeon: Abel Galvin MD;  Location: [facility];  Service: General;  Laterality: N/A;    COLON SURGERY      COLONOSCOPY      ENDOSCOPY      TONSILLECTOMY           Family History   Problem Relation Age of Onset    Hypertension Mother     Heart disease Mother     Atrial fibrillation Mother     Hypertension Father     Diabetes Father         Type 2    Cancer Sister     Cancer Sister         Breast Cancer         Social History     Socioeconomic History    Marital status:    Tobacco Use    Smoking status: Never     Passive exposure: Never    Smokeless tobacco: Never   Vaping Use    Vaping status: Never Used   Substance and Sexual Activity    Alcohol use: Never    Drug use: Never    Sexual activity: Defer          Review of Systems   Pertinent items are noted in HPI     Constitutional: No fevers, chills or malaise. No unintentional weight loss   Eyes: Denies visual changes    Cardiovascular: Denies chest pain, palpitations   Respiratory: Denies cough or shortness of breath   Abdominal/Gastrointestinal: No abdominal pain, no nausea/vomiting   Genitourinary: Denies dysuria or  "hematuria   Musculoskeletal: Denies any chronic joint aches, pains or deformities              Skin: No lesions or rashes   Psychiatric: No recent mood changes   Neurologic: No paresthesias or loss of function        Objective       Physical Exam:      04/24/25  1043   Weight: 132 kg (290 lb)    Body mass index is 37.23 kg/m².  Constitution: Ht 188 cm (74\")   Wt 132 kg (290 lb)   BMI 37.23 kg/m²  . No acute distress  Head: Normocephalic, atraumatic.   Eyes: Aligned without strabismus. Conjunctivae noninjected   Ears, Nose, Mouth:no lesions noted  CV: Regular rate and rhythm   Respiratory: Symmetric chest expansion. No respiratory distress.   Gastrointestinal: Large ventral hernia with loss of domain, unable to fully reduce or feel fascial edges  Skin:  No cyanosis, clubbing or edema bilaterally  Neurologic: No gross deficits.  Alert and oriented x3  Psychiatric: Normal mood      Assessment   Diagnoses and all orders for this visit:    1. Ventral hernia without obstruction or gangrene (Primary)  -     CT Abdomen Pelvis With Contrast; Future      Piotr Kc is a 64 y.o. male with a large ventral hernia.  He has not had imaging to evaluate this hernia and he has had multiple surgical interventions.  BMI 37.  Hemoglobin A1c 6.6.  He is a non-smoker.  Will plan to evaluate on CT scan whether he would be a candidate for a robotic versus open repair versus need to be transferred to a tertiary care center.         Shy Burgos MD  [facility]- General Surgery    "

## 2025-05-07 ENCOUNTER — HOSPITAL ENCOUNTER (OUTPATIENT)
Dept: CT IMAGING | Facility: HOSPITAL | Age: 65
Discharge: HOME | End: 2025-05-07
Admitting: SURGERY
Payer: COMMERCIAL

## 2025-05-07 DIAGNOSIS — K43.9 VENTRAL HERNIA WITHOUT OBSTRUCTION OR GANGRENE: ICD-10-CM

## 2025-05-07 PROCEDURE — 25510000001 IOPAMIDOL 61 % SOLUTION: Performed by: SURGERY

## 2025-05-07 PROCEDURE — 74177 CT ABD & PELVIS W/CONTRAST: CPT | Performed by: RADIOLOGY

## 2025-05-07 PROCEDURE — 74177 CT ABD & PELVIS W/CONTRAST: CPT

## 2025-05-07 RX ORDER — IOPAMIDOL 612 MG/ML
100 INJECTION, SOLUTION INTRAVASCULAR
Status: COMPLETED | OUTPATIENT
Start: 2025-05-07 | End: 2025-05-07

## 2025-05-07 RX ADMIN — IOPAMIDOL 100 ML: 612 INJECTION, SOLUTION INTRAVENOUS at 09:34

## 2025-05-08 ENCOUNTER — OFFICE VISIT (OUTPATIENT)
Age: 65
End: 2025-05-08
Payer: COMMERCIAL

## 2025-05-08 VITALS — HEIGHT: 74 IN | WEIGHT: 290 LBS | BODY MASS INDEX: 37.22 KG/M2

## 2025-05-08 DIAGNOSIS — K43.9 VENTRAL HERNIA WITHOUT OBSTRUCTION OR GANGRENE: Primary | ICD-10-CM

## 2025-05-09 ENCOUNTER — TELEPHONE (OUTPATIENT)
Dept: SURGERY | Facility: CLINIC | Age: 65
End: 2025-05-09
Payer: COMMERCIAL

## 2025-05-09 RX ORDER — SODIUM CHLORIDE 0.9 % (FLUSH) 0.9 %
10 SYRINGE (ML) INJECTION EVERY 12 HOURS SCHEDULED
OUTPATIENT
Start: 2025-05-09

## 2025-05-09 RX ORDER — SODIUM CHLORIDE 9 MG/ML
40 INJECTION, SOLUTION INTRAVENOUS AS NEEDED
OUTPATIENT
Start: 2025-05-09

## 2025-05-09 RX ORDER — SODIUM CHLORIDE 0.9 % (FLUSH) 0.9 %
10 SYRINGE (ML) INJECTION AS NEEDED
OUTPATIENT
Start: 2025-05-09

## 2025-05-09 NOTE — TELEPHONE ENCOUNTER
Called to inform patient to hold Mounjaro two weeks prior to surgery scheduled for 06/18 with Dr. IBRAHIM. Patient verbalized understanding.

## 2025-05-09 NOTE — PROGRESS NOTES
Date of Service: 5/9/2025    Subjective   Piotr Kc is a 64 y.o. male is being in consultation today at the request of Kiel Kline MD    Chief Complaint: ventral hernia     Piotr Kc is a 64 y.o. male with a complex ventral hernia. CT scan performed which showed a swiss cheese defect. Reports intermittent episodes of abdominal pain.     Past Medical History:   Diagnosis Date    Cholelithiasis     Gallbladder was Removed by Dr Galvin    Colon polyp 2021    Crohn's colitis     Diabetes mellitus 12/1982    Type 1    Diabetes mellitus type I     Hernia of fascia     Hypertension     Insulin pump in place     Obesity        Past Surgical History:   Procedure Laterality Date    CHOLECYSTECTOMY N/A 2/6/2025    Procedure: CHOLECYSTECTOMY LAPAROSCOPIC;  Surgeon: Abel Galvin MD;  Location: [facility];  Service: General;  Laterality: N/A;    COLON SURGERY      COLONOSCOPY      ENDOSCOPY      TONSILLECTOMY           Family History   Problem Relation Age of Onset    Hypertension Mother     Heart disease Mother     Atrial fibrillation Mother     Hypertension Father     Diabetes Father         Type 2    Cancer Sister     Cancer Sister         Breast Cancer         Social History     Socioeconomic History    Marital status:    Tobacco Use    Smoking status: Never     Passive exposure: Never    Smokeless tobacco: Never   Vaping Use    Vaping status: Never Used   Substance and Sexual Activity    Alcohol use: Never    Drug use: Never    Sexual activity: Defer       Review of Systems   Pertinent items are noted in HPI     Constitutional: No fevers, chills or malaise. No unintentional weight loss   Eyes: Denies visual changes    Cardiovascular: Denies chest pain, palpitations   Respiratory: Denies cough or shortness of breath   Abdominal/Gastrointestinal: No abdominal pain, no nausea/vomiting   Genitourinary: Denies dysuria or hematuria   Musculoskeletal: Denies any chronic joint aches, pains or  "deformities              Skin: No lesions or rashes   Psychiatric: No recent mood changes   Neurologic: No paresthesias or loss of function        Objective       Physical Exam:      05/08/25  0959   Weight: 132 kg (290 lb)    Body mass index is 37.22 kg/m².  Constitution: Ht 188 cm (74.02\")   Wt 132 kg (290 lb)   BMI 37.22 kg/m²  . No acute distress  Head: Normocephalic, atraumatic.   Eyes: Aligned without strabismus. Conjunctivae noninjected   Ears, Nose, Mouth:no lesions noted  CV: Regular rate and rhythm   Respiratory: Symmetric chest expansion. No respiratory distress.   Gastrointestinal:  Abdominal hernia present   Skin:  No cyanosis, clubbing or edema bilaterally  Neurologic: No gross deficits.  Alert and oriented x3  Psychiatric: Normal mood      Assessment   Diagnoses and all orders for this visit:    1. Ventral hernia without obstruction or gangrene (Primary)  -     Case Request; Standing  -     Follow Anesthesia Guidelines / Protocol; Future  -     Follow Anesthesia Guidelines / Protocol; Standing  -     Verify NPO Status; Standing  -     Verify / Perform Chlorhexidine Skin Prep; Standing  -     Instructions on Coughing, Deep Breathing & Incentive Spirometry; Standing  -     MRSA Screen, PCR (Inpatient) - Swab, Nares; Standing  -     ECG 12 Lead; Future  -     Insert Peripheral IV; Standing  -     Saline Lock & Maintain IV Access; Standing  -     sodium chloride 0.9 % flush 10 mL  -     sodium chloride 0.9 % flush 10 mL  -     sodium chloride 0.9 % infusion 40 mL  -     Place Sequential Compression Device; Standing  -     Maintain Sequential Compression Device; Standing  -     ceFAZolin 3000 mg IVPB in 100 mL NS (VTB)  -     Case Request  -     MRSA Screen, PCR (Inpatient) - Swab, Nares      Piotr Kc is a 64 y.o. male with a large symptomatic ventral hernia. We have discussed options for repair and I have recommended a robotic ventral incisional hernia repair using phasix ST mesh. He is " agreeable to this.     Will plan to perform surgery on 6/18. He will need to hold his mounjaro 2 weeks beforehand.          Shy Burgos MD  [facility]

## 2025-05-29 ENCOUNTER — TELEPHONE (OUTPATIENT)
Dept: ENDOCRINOLOGY | Facility: CLINIC | Age: 65
End: 2025-05-29
Payer: COMMERCIAL

## 2025-06-13 NOTE — DISCHARGE INSTRUCTIONS
Please discontinue all blood thinners and anticoagulants (except aspirin) prior to surgery as per your surgeon and cardiologist instructions.  Aspirin may be continued up to the day prior to surgery.    HOLD all diabetic medications the morning of surgery as order by physician.    Please follow instructions on use of prep cloths provided by nurse. Return instruction sheet to pre-op nurse on day of surgery.    Arrival time for surgery on 6/18/25  will be given to you by Dr. Garcia's  Office.(0630 AM)    A RESPONSIBLE PERSON MUST REMAIN IN THE WAITING ROOM DURING YOUR PROCEDURE AND A RESPONSIBLE  MUST BE AVAILABLE UPON YOUR DISCHARGE.    General Instructions:  Do NOT eat or drink after midnight 6/17/25 which includes water, mints, or gum.  You may brush your teeth. Dental appliances that are removable must be taken out day of surgery.  Do NOT smoke, chew tobacco, or drink alcohol within 24 hours prior to surgery.  Bring medications in original bottles, any inhalers and if applicable your C-PAP/BI-PAP machine  Bring any papers given to you in the doctor’s office  Wear clean, comfortable clothes and socks  Do NOT wear contact lenses or make-up or dark nail polish.  Bring a case for your glasses if applicable.  Bring crutches or walker if applicable  Leave all other valuables and jewelry at home  If you were given a blood bank armband, continue to wear it until discharged.    Preventing a Surgical Site Infection:  Shower the night before surgery (unless instructed otherwise) using a fresh bar of anti-bacterial soap (such as Dial) and clean washcloth.  Dry with a clean towel and dress in clean clothing.  For 2 to 3 days before surgery, avoid shaving with a razor near where you will have surgery because the razor can irritate skin and make it easier to develop an infection.  Ask your surgeon if you will be receiving antibiotics prior to surgery.  Make sure you, your family, and all healthcare providers clean  their hands with soap and water or an alcohol-based hand  before caring for you or your wound.  If at all possible, quit smoking as many days before surgery as you can.    Day of Surgery:  Upon arrival, a pre-op nurse and anesthesiologist will review your health history, obtain vital signs, and answer questions you may have.  The only belongings needed at this time will be your home medications and if applicable you C-PAP/BI-PAP machine.  If you are staying overnight, your family can leave the rest of your belongings in the car and bring them to your room later.  A pre-op nurse will start an IV and you may receive medication in preparation for surgery.  Due to patient privacy and limited space, only one member of your family will be able to accompany you in the pre-op area.  While you are in surgery your family should notify the waiting room  if they leave the waiting room area and provide a contact number.  Please be aware that surgery does come with discomfort.  We want to make every effort to control your discomfort so please discuss any uncontrolled symptoms with your nurse.  Your doctor will most likely have prescribed pain medications.  If you are going home after surgery you will receive individualized written care instructions before being discharged.  A responsible adult must drive you to and from the hospital on the day of surgery and stay with you for 24 hours.  If you are staying overnight following surgery, you will be transported to your hospital room following the recovery period.

## 2025-06-16 ENCOUNTER — PRE-ADMISSION TESTING (OUTPATIENT)
Dept: PREADMISSION TESTING | Facility: HOSPITAL | Age: 65
End: 2025-06-16
Payer: COMMERCIAL

## 2025-06-16 DIAGNOSIS — K43.9 VENTRAL HERNIA WITHOUT OBSTRUCTION OR GANGRENE: ICD-10-CM

## 2025-06-16 LAB
ANION GAP SERPL CALCULATED.3IONS-SCNC: 13.5 MMOL/L (ref 5–15)
BUN SERPL-MCNC: 10 MG/DL (ref 8–23)
BUN/CREAT SERPL: 14.3 (ref 7–25)
CALCIUM SPEC-SCNC: 9.5 MG/DL (ref 8.6–10.5)
CHLORIDE SERPL-SCNC: 102 MMOL/L (ref 98–107)
CO2 SERPL-SCNC: 24.5 MMOL/L (ref 22–29)
CREAT SERPL-MCNC: 0.7 MG/DL (ref 0.76–1.27)
DEPRECATED RDW RBC AUTO: 43.8 FL (ref 37–54)
EGFRCR SERPLBLD CKD-EPI 2021: 103.5 ML/MIN/1.73
ERYTHROCYTE [DISTWIDTH] IN BLOOD BY AUTOMATED COUNT: 14.3 % (ref 12.3–15.4)
GLUCOSE SERPL-MCNC: 223 MG/DL (ref 65–99)
HCT VFR BLD AUTO: 44.5 % (ref 37.5–51)
HGB BLD-MCNC: 14.7 G/DL (ref 13–17.7)
MCH RBC QN AUTO: 27.7 PG (ref 26.6–33)
MCHC RBC AUTO-ENTMCNC: 33 G/DL (ref 31.5–35.7)
MCV RBC AUTO: 83.8 FL (ref 79–97)
PLATELET # BLD AUTO: 219 10*3/MM3 (ref 140–450)
PMV BLD AUTO: 10.3 FL (ref 6–12)
POTASSIUM SERPL-SCNC: 3.5 MMOL/L (ref 3.5–5.2)
RBC # BLD AUTO: 5.31 10*6/MM3 (ref 4.14–5.8)
SODIUM SERPL-SCNC: 140 MMOL/L (ref 136–145)
WBC NRBC COR # BLD AUTO: 6.26 10*3/MM3 (ref 3.4–10.8)

## 2025-06-16 PROCEDURE — 80048 BASIC METABOLIC PNL TOTAL CA: CPT

## 2025-06-16 PROCEDURE — 85027 COMPLETE CBC AUTOMATED: CPT

## 2025-06-16 PROCEDURE — 36415 COLL VENOUS BLD VENIPUNCTURE: CPT

## 2025-06-16 RX ORDER — VALSARTAN 40 MG/1
40 TABLET ORAL DAILY
COMMUNITY

## 2025-06-18 ENCOUNTER — APPOINTMENT (OUTPATIENT)
Dept: GENERAL RADIOLOGY | Facility: HOSPITAL | Age: 65
End: 2025-06-18
Payer: COMMERCIAL

## 2025-06-18 ENCOUNTER — HOSPITAL ENCOUNTER (OUTPATIENT)
Facility: HOSPITAL | Age: 65
Setting detail: OBSERVATION
Discharge: HOME | End: 2025-06-19
Attending: SURGERY | Admitting: SURGERY
Payer: COMMERCIAL

## 2025-06-18 ENCOUNTER — ANESTHESIA EVENT (OUTPATIENT)
Dept: PERIOP | Facility: HOSPITAL | Age: 65
End: 2025-06-18
Payer: COMMERCIAL

## 2025-06-18 ENCOUNTER — ANESTHESIA (OUTPATIENT)
Dept: PERIOP | Facility: HOSPITAL | Age: 65
End: 2025-06-18
Payer: COMMERCIAL

## 2025-06-18 DIAGNOSIS — K43.9 VENTRAL HERNIA WITHOUT OBSTRUCTION OR GANGRENE: ICD-10-CM

## 2025-06-18 LAB
GLUCOSE BLDC GLUCOMTR-MCNC: 177 MG/DL (ref 70–130)
GLUCOSE BLDC GLUCOMTR-MCNC: 214 MG/DL (ref 70–130)
GLUCOSE BLDC GLUCOMTR-MCNC: 382 MG/DL (ref 70–130)
GLUCOSE BLDC GLUCOMTR-MCNC: 430 MG/DL (ref 70–130)

## 2025-06-18 PROCEDURE — 25010000002 GLYCOPYRROLATE 0.4 MG/2ML SOLUTION: Performed by: NURSE ANESTHETIST, CERTIFIED REGISTERED

## 2025-06-18 PROCEDURE — 49596 RPR AA HRN 1ST > 10 NCR/STRN: CPT | Performed by: SURGERY

## 2025-06-18 PROCEDURE — G0378 HOSPITAL OBSERVATION PER HR: HCPCS

## 2025-06-18 PROCEDURE — 25010000002 CEFAZOLIN 3 G RECONSTITUTED SOLUTION 1 EACH VIAL: Performed by: SURGERY

## 2025-06-18 PROCEDURE — 25010000002 HYDROMORPHONE 1 MG/ML SOLUTION: Performed by: ANESTHESIOLOGY

## 2025-06-18 PROCEDURE — 82948 REAGENT STRIP/BLOOD GLUCOSE: CPT

## 2025-06-18 PROCEDURE — S0260 H&P FOR SURGERY: HCPCS | Performed by: SURGERY

## 2025-06-18 PROCEDURE — 25010000002 NEOSTIGMINE 10 MG/10ML SOLUTION: Performed by: NURSE ANESTHETIST, CERTIFIED REGISTERED

## 2025-06-18 PROCEDURE — 25010000002 PIPERACILLIN SOD-TAZOBACTAM PER 1 G: Performed by: SURGERY

## 2025-06-18 PROCEDURE — 25010000002 ONDANSETRON PER 1 MG: Performed by: NURSE ANESTHETIST, CERTIFIED REGISTERED

## 2025-06-18 PROCEDURE — 25010000002 DEXAMETHASONE PER 1 MG: Performed by: NURSE ANESTHETIST, CERTIFIED REGISTERED

## 2025-06-18 PROCEDURE — C1781 MESH (IMPLANTABLE): HCPCS | Performed by: SURGERY

## 2025-06-18 PROCEDURE — 25010000002 PROPOFOL 10 MG/ML EMULSION: Performed by: NURSE ANESTHETIST, CERTIFIED REGISTERED

## 2025-06-18 PROCEDURE — 25010000002 MIDAZOLAM PER 1 MG: Performed by: NURSE ANESTHETIST, CERTIFIED REGISTERED

## 2025-06-18 PROCEDURE — 25010000002 LIDOCAINE PF 2% 2 % SOLUTION: Performed by: NURSE ANESTHETIST, CERTIFIED REGISTERED

## 2025-06-18 PROCEDURE — 25010000002 ROPIVACAINE PER 1 MG: Performed by: ANESTHESIOLOGY

## 2025-06-18 PROCEDURE — 25010000002 FAMOTIDINE (PF) 20 MG/2ML SOLUTION: Performed by: NURSE ANESTHETIST, CERTIFIED REGISTERED

## 2025-06-18 PROCEDURE — 25010000002 KETOROLAC TROMETHAMINE PER 15 MG: Performed by: NURSE ANESTHETIST, CERTIFIED REGISTERED

## 2025-06-18 PROCEDURE — 25810000003 LACTATED RINGERS PER 1000 ML: Performed by: ANESTHESIOLOGY

## 2025-06-18 PROCEDURE — 25010000002 FENTANYL CITRATE (PF) 50 MCG/ML SOLUTION: Performed by: NURSE ANESTHETIST, CERTIFIED REGISTERED

## 2025-06-18 DEVICE — PHASIX ST MESH, 20 CM X 25 CM (8" X 10"), RECTANGLE
Type: IMPLANTABLE DEVICE | Site: ABDOMEN | Status: FUNCTIONAL
Brand: PHASIX

## 2025-06-18 DEVICE — ABSORBABLE WOUND CLOSURE DEVICE
Type: IMPLANTABLE DEVICE | Site: ABDOMEN | Status: FUNCTIONAL
Brand: V-LOC 180

## 2025-06-18 DEVICE — ABSORBABLE WOUND CLOSURE DEVICE
Type: IMPLANTABLE DEVICE | Site: ABDOMEN | Status: FUNCTIONAL
Brand: V-LOC 90

## 2025-06-18 RX ORDER — DEXAMETHASONE SODIUM PHOSPHATE 4 MG/ML
INJECTION, SOLUTION INTRA-ARTICULAR; INTRALESIONAL; INTRAMUSCULAR; INTRAVENOUS; SOFT TISSUE
Status: COMPLETED | OUTPATIENT
Start: 2025-06-18 | End: 2025-06-18

## 2025-06-18 RX ORDER — MIDAZOLAM HYDROCHLORIDE 1 MG/ML
INJECTION, SOLUTION INTRAMUSCULAR; INTRAVENOUS AS NEEDED
Status: DISCONTINUED | OUTPATIENT
Start: 2025-06-18 | End: 2025-06-18 | Stop reason: SURG

## 2025-06-18 RX ORDER — VALSARTAN 80 MG/1
40 TABLET ORAL DAILY
Status: DISCONTINUED | OUTPATIENT
Start: 2025-06-19 | End: 2025-06-19 | Stop reason: HOSPADM

## 2025-06-18 RX ORDER — METHOCARBAMOL 750 MG/1
750 TABLET, FILM COATED ORAL 4 TIMES DAILY
Status: DISCONTINUED | OUTPATIENT
Start: 2025-06-18 | End: 2025-06-19 | Stop reason: HOSPADM

## 2025-06-18 RX ORDER — MEPERIDINE HYDROCHLORIDE 25 MG/ML
12.5 INJECTION INTRAMUSCULAR; INTRAVENOUS; SUBCUTANEOUS
Status: DISCONTINUED | OUTPATIENT
Start: 2025-06-18 | End: 2025-06-18 | Stop reason: HOSPADM

## 2025-06-18 RX ORDER — ONDANSETRON 2 MG/ML
4 INJECTION INTRAMUSCULAR; INTRAVENOUS AS NEEDED
Status: DISCONTINUED | OUTPATIENT
Start: 2025-06-18 | End: 2025-06-18 | Stop reason: HOSPADM

## 2025-06-18 RX ORDER — PANTOPRAZOLE SODIUM 40 MG/1
40 TABLET, DELAYED RELEASE ORAL
Status: CANCELLED | OUTPATIENT
Start: 2025-06-18

## 2025-06-18 RX ORDER — GLYCOPYRROLATE 0.2 MG/ML
INJECTION INTRAMUSCULAR; INTRAVENOUS AS NEEDED
Status: DISCONTINUED | OUTPATIENT
Start: 2025-06-18 | End: 2025-06-18 | Stop reason: SURG

## 2025-06-18 RX ORDER — FAMOTIDINE 10 MG/ML
INJECTION, SOLUTION INTRAVENOUS AS NEEDED
Status: DISCONTINUED | OUTPATIENT
Start: 2025-06-18 | End: 2025-06-18 | Stop reason: SURG

## 2025-06-18 RX ORDER — GABAPENTIN 100 MG/1
200 CAPSULE ORAL 3 TIMES DAILY
Status: DISCONTINUED | OUTPATIENT
Start: 2025-06-18 | End: 2025-06-19 | Stop reason: HOSPADM

## 2025-06-18 RX ORDER — MIDAZOLAM HYDROCHLORIDE 1 MG/ML
1 INJECTION, SOLUTION INTRAMUSCULAR; INTRAVENOUS
Status: DISCONTINUED | OUTPATIENT
Start: 2025-06-18 | End: 2025-06-18 | Stop reason: HOSPADM

## 2025-06-18 RX ORDER — SODIUM CHLORIDE 0.9 % (FLUSH) 0.9 %
10 SYRINGE (ML) INJECTION AS NEEDED
Status: DISCONTINUED | OUTPATIENT
Start: 2025-06-18 | End: 2025-06-18 | Stop reason: HOSPADM

## 2025-06-18 RX ORDER — PRUCALOPRIDE 2 MG/1
1 TABLET ORAL DAILY
Status: CANCELLED | OUTPATIENT
Start: 2025-06-18

## 2025-06-18 RX ORDER — VALSARTAN 80 MG/1
40 TABLET ORAL DAILY
Status: CANCELLED | OUTPATIENT
Start: 2025-06-18

## 2025-06-18 RX ORDER — SODIUM CHLORIDE 9 MG/ML
40 INJECTION, SOLUTION INTRAVENOUS AS NEEDED
Status: DISCONTINUED | OUTPATIENT
Start: 2025-06-18 | End: 2025-06-18 | Stop reason: HOSPADM

## 2025-06-18 RX ORDER — FENTANYL CITRATE 50 UG/ML
50 INJECTION, SOLUTION INTRAMUSCULAR; INTRAVENOUS
Status: DISCONTINUED | OUTPATIENT
Start: 2025-06-18 | End: 2025-06-18 | Stop reason: HOSPADM

## 2025-06-18 RX ORDER — SODIUM CHLORIDE 0.9 % (FLUSH) 0.9 %
10 SYRINGE (ML) INJECTION EVERY 12 HOURS SCHEDULED
Status: DISCONTINUED | OUTPATIENT
Start: 2025-06-18 | End: 2025-06-18 | Stop reason: HOSPADM

## 2025-06-18 RX ORDER — FENTANYL CITRATE 50 UG/ML
INJECTION, SOLUTION INTRAMUSCULAR; INTRAVENOUS AS NEEDED
Status: DISCONTINUED | OUTPATIENT
Start: 2025-06-18 | End: 2025-06-18 | Stop reason: SURG

## 2025-06-18 RX ORDER — PRUCALOPRIDE 2 MG/1
1 TABLET ORAL DAILY
Status: DISCONTINUED | OUTPATIENT
Start: 2025-06-19 | End: 2025-06-19 | Stop reason: HOSPADM

## 2025-06-18 RX ORDER — IPRATROPIUM BROMIDE AND ALBUTEROL SULFATE 2.5; .5 MG/3ML; MG/3ML
3 SOLUTION RESPIRATORY (INHALATION) ONCE AS NEEDED
Status: DISCONTINUED | OUTPATIENT
Start: 2025-06-18 | End: 2025-06-18 | Stop reason: HOSPADM

## 2025-06-18 RX ORDER — CISATRACURIUM BESYLATE 2 MG/ML
INJECTION, SOLUTION INTRAVENOUS AS NEEDED
Status: DISCONTINUED | OUTPATIENT
Start: 2025-06-18 | End: 2025-06-18 | Stop reason: SURG

## 2025-06-18 RX ORDER — DEXAMETHASONE SODIUM PHOSPHATE 4 MG/ML
INJECTION, SOLUTION INTRA-ARTICULAR; INTRALESIONAL; INTRAMUSCULAR; INTRAVENOUS; SOFT TISSUE AS NEEDED
Status: DISCONTINUED | OUTPATIENT
Start: 2025-06-18 | End: 2025-06-18 | Stop reason: SURG

## 2025-06-18 RX ORDER — CHOLECALCIFEROL (VITAMIN D3) 25 MCG
2000 TABLET ORAL DAILY
COMMUNITY

## 2025-06-18 RX ORDER — ROPIVACAINE HYDROCHLORIDE 5 MG/ML
INJECTION, SOLUTION EPIDURAL; INFILTRATION; PERINEURAL
Status: COMPLETED | OUTPATIENT
Start: 2025-06-18 | End: 2025-06-18

## 2025-06-18 RX ORDER — PANTOPRAZOLE SODIUM 40 MG/1
40 TABLET, DELAYED RELEASE ORAL
Status: DISCONTINUED | OUTPATIENT
Start: 2025-06-19 | End: 2025-06-19 | Stop reason: HOSPADM

## 2025-06-18 RX ORDER — CHOLESTYRAMINE LIGHT 4 G/5.7G
1 POWDER, FOR SUSPENSION ORAL DAILY
Status: CANCELLED | OUTPATIENT
Start: 2025-06-18

## 2025-06-18 RX ORDER — KETOROLAC TROMETHAMINE 30 MG/ML
30 INJECTION, SOLUTION INTRAMUSCULAR; INTRAVENOUS EVERY 6 HOURS PRN
Status: COMPLETED | OUTPATIENT
Start: 2025-06-18 | End: 2025-06-18

## 2025-06-18 RX ORDER — BUDESONIDE 3 MG/1
9 CAPSULE, COATED PELLETS ORAL NIGHTLY
Status: CANCELLED | OUTPATIENT
Start: 2025-06-18

## 2025-06-18 RX ORDER — EPHEDRINE SULFATE/0.9% NACL/PF 25 MG/5 ML
SYRINGE (ML) INTRAVENOUS AS NEEDED
Status: DISCONTINUED | OUTPATIENT
Start: 2025-06-18 | End: 2025-06-18 | Stop reason: SURG

## 2025-06-18 RX ORDER — MORPHINE SULFATE 2 MG/ML
2 INJECTION, SOLUTION INTRAMUSCULAR; INTRAVENOUS
Status: DISCONTINUED | OUTPATIENT
Start: 2025-06-18 | End: 2025-06-19 | Stop reason: HOSPADM

## 2025-06-18 RX ORDER — OXYCODONE HYDROCHLORIDE 5 MG/1
5 TABLET ORAL EVERY 4 HOURS PRN
Refills: 0 | Status: DISCONTINUED | OUTPATIENT
Start: 2025-06-18 | End: 2025-06-19 | Stop reason: HOSPADM

## 2025-06-18 RX ORDER — PROPOFOL 10 MG/ML
VIAL (ML) INTRAVENOUS AS NEEDED
Status: DISCONTINUED | OUTPATIENT
Start: 2025-06-18 | End: 2025-06-18 | Stop reason: SURG

## 2025-06-18 RX ORDER — CHOLECALCIFEROL (VITAMIN D3) 25 MCG
2000 TABLET ORAL DAILY
Status: DISCONTINUED | OUTPATIENT
Start: 2025-06-19 | End: 2025-06-19 | Stop reason: HOSPADM

## 2025-06-18 RX ORDER — CYANOCOBALAMIN 1000 UG/ML
1000 INJECTION, SOLUTION INTRAMUSCULAR; SUBCUTANEOUS
Status: CANCELLED | OUTPATIENT
Start: 2025-06-18

## 2025-06-18 RX ORDER — CYANOCOBALAMIN 1000 UG/ML
1000 INJECTION, SOLUTION INTRAMUSCULAR; SUBCUTANEOUS
Status: DISCONTINUED | OUTPATIENT
Start: 2025-06-27 | End: 2025-06-19 | Stop reason: HOSPADM

## 2025-06-18 RX ORDER — CHOLECALCIFEROL (VITAMIN D3) 25 MCG
2000 TABLET ORAL DAILY
Status: CANCELLED | OUTPATIENT
Start: 2025-06-18

## 2025-06-18 RX ORDER — OXYCODONE AND ACETAMINOPHEN 5; 325 MG/1; MG/1
1 TABLET ORAL ONCE AS NEEDED
Status: DISCONTINUED | OUTPATIENT
Start: 2025-06-18 | End: 2025-06-18 | Stop reason: HOSPADM

## 2025-06-18 RX ORDER — SODIUM CHLORIDE, SODIUM LACTATE, POTASSIUM CHLORIDE, CALCIUM CHLORIDE 600; 310; 30; 20 MG/100ML; MG/100ML; MG/100ML; MG/100ML
125 INJECTION, SOLUTION INTRAVENOUS ONCE
Status: COMPLETED | OUTPATIENT
Start: 2025-06-18 | End: 2025-06-18

## 2025-06-18 RX ORDER — BUDESONIDE 3 MG/1
9 CAPSULE, COATED PELLETS ORAL NIGHTLY
Status: DISCONTINUED | OUTPATIENT
Start: 2025-06-18 | End: 2025-06-19 | Stop reason: HOSPADM

## 2025-06-18 RX ORDER — ESOMEPRAZOLE MAGNESIUM 40 MG/1
40 CAPSULE, DELAYED RELEASE ORAL 2 TIMES DAILY
COMMUNITY

## 2025-06-18 RX ORDER — LIDOCAINE HYDROCHLORIDE 20 MG/ML
INJECTION, SOLUTION EPIDURAL; INFILTRATION; INTRACAUDAL; PERINEURAL AS NEEDED
Status: DISCONTINUED | OUTPATIENT
Start: 2025-06-18 | End: 2025-06-18 | Stop reason: SURG

## 2025-06-18 RX ORDER — SODIUM CHLORIDE, SODIUM LACTATE, POTASSIUM CHLORIDE, CALCIUM CHLORIDE 600; 310; 30; 20 MG/100ML; MG/100ML; MG/100ML; MG/100ML
100 INJECTION, SOLUTION INTRAVENOUS ONCE AS NEEDED
Status: DISCONTINUED | OUTPATIENT
Start: 2025-06-18 | End: 2025-06-18 | Stop reason: HOSPADM

## 2025-06-18 RX ORDER — ATORVASTATIN CALCIUM 40 MG/1
40 TABLET, FILM COATED ORAL NIGHTLY
Status: CANCELLED | OUTPATIENT
Start: 2025-06-18

## 2025-06-18 RX ORDER — NEOSTIGMINE METHYLSULFATE 1 MG/ML
INJECTION INTRAVENOUS AS NEEDED
Status: DISCONTINUED | OUTPATIENT
Start: 2025-06-18 | End: 2025-06-18 | Stop reason: SURG

## 2025-06-18 RX ORDER — ONDANSETRON 2 MG/ML
INJECTION INTRAMUSCULAR; INTRAVENOUS AS NEEDED
Status: DISCONTINUED | OUTPATIENT
Start: 2025-06-18 | End: 2025-06-18 | Stop reason: SURG

## 2025-06-18 RX ORDER — ATORVASTATIN CALCIUM 40 MG/1
40 TABLET, FILM COATED ORAL NIGHTLY
Status: DISCONTINUED | OUTPATIENT
Start: 2025-06-18 | End: 2025-06-19 | Stop reason: HOSPADM

## 2025-06-18 RX ADMIN — DESOGESTREL AND ETHINYL ESTRADIOL 5 MG: KIT at 10:16

## 2025-06-18 RX ADMIN — BUDESONIDE 9 MG: 3 CAPSULE ORAL at 22:00

## 2025-06-18 RX ADMIN — SODIUM CHLORIDE 3000 MG: 9 INJECTION, SOLUTION INTRAVENOUS at 07:24

## 2025-06-18 RX ADMIN — LIDOCAINE HYDROCHLORIDE 100 MG: 20 INJECTION, SOLUTION EPIDURAL; INFILTRATION; INTRACAUDAL; PERINEURAL at 07:29

## 2025-06-18 RX ADMIN — FENTANYL CITRATE 50 MCG: 50 INJECTION, SOLUTION INTRAMUSCULAR; INTRAVENOUS at 10:37

## 2025-06-18 RX ADMIN — CISATRACURIUM BESYLATE 4 MG: 20 INJECTION INTRAVENOUS at 09:28

## 2025-06-18 RX ADMIN — FENTANYL CITRATE 50 MCG: 50 INJECTION, SOLUTION INTRAMUSCULAR; INTRAVENOUS at 10:44

## 2025-06-18 RX ADMIN — FAMOTIDINE 20 MG: 10 INJECTION, SOLUTION INTRAVENOUS at 07:24

## 2025-06-18 RX ADMIN — GLYCOPYRROLATE 0.6 MG: 0.2 INJECTION INTRAMUSCULAR; INTRAVENOUS at 10:16

## 2025-06-18 RX ADMIN — KETOROLAC TROMETHAMINE 30 MG: 30 INJECTION, SOLUTION INTRAMUSCULAR; INTRAVENOUS at 10:54

## 2025-06-18 RX ADMIN — DEXAMETHASONE SODIUM PHOSPHATE 4 MG: 4 INJECTION, SOLUTION INTRA-ARTICULAR; INTRALESIONAL; INTRAMUSCULAR; INTRAVENOUS; SOFT TISSUE at 07:33

## 2025-06-18 RX ADMIN — METHOCARBAMOL 750 MG: 750 TABLET ORAL at 21:07

## 2025-06-18 RX ADMIN — ROPIVACAINE HYDROCHLORIDE 300 MG: 5 INJECTION, SOLUTION EPIDURAL; INFILTRATION; PERINEURAL at 07:38

## 2025-06-18 RX ADMIN — SODIUM CHLORIDE, POTASSIUM CHLORIDE, SODIUM LACTATE AND CALCIUM CHLORIDE: 600; 310; 30; 20 INJECTION, SOLUTION INTRAVENOUS at 09:35

## 2025-06-18 RX ADMIN — PIPERACILLIN AND TAZOBACTAM 3.38 G: 3; .375 INJECTION, POWDER, FOR SOLUTION INTRAVENOUS at 15:11

## 2025-06-18 RX ADMIN — METHOCARBAMOL 750 MG: 750 TABLET ORAL at 18:04

## 2025-06-18 RX ADMIN — CISATRACURIUM BESYLATE 16 MG: 20 INJECTION INTRAVENOUS at 07:29

## 2025-06-18 RX ADMIN — GABAPENTIN 200 MG: 100 CAPSULE ORAL at 15:11

## 2025-06-18 RX ADMIN — EPHEDRINE SULFATE 5 MG: 5 INJECTION INTRAVENOUS at 08:19

## 2025-06-18 RX ADMIN — SODIUM CHLORIDE, POTASSIUM CHLORIDE, SODIUM LACTATE AND CALCIUM CHLORIDE: 600; 310; 30; 20 INJECTION, SOLUTION INTRAVENOUS at 07:57

## 2025-06-18 RX ADMIN — ONDANSETRON 4 MG: 2 INJECTION INTRAMUSCULAR; INTRAVENOUS at 07:24

## 2025-06-18 RX ADMIN — PIPERACILLIN AND TAZOBACTAM 3.38 G: 3; .375 INJECTION, POWDER, FOR SOLUTION INTRAVENOUS at 21:35

## 2025-06-18 RX ADMIN — PROPOFOL 200 MG: 10 INJECTION, EMULSION INTRAVENOUS at 07:29

## 2025-06-18 RX ADMIN — GLYCOPYRROLATE 0.2 MG: 0.2 INJECTION INTRAMUSCULAR; INTRAVENOUS at 08:18

## 2025-06-18 RX ADMIN — CISATRACURIUM BESYLATE 2 MG: 20 INJECTION INTRAVENOUS at 09:13

## 2025-06-18 RX ADMIN — ATORVASTATIN CALCIUM 40 MG: 40 TABLET, FILM COATED ORAL at 21:34

## 2025-06-18 RX ADMIN — CISATRACURIUM BESYLATE 2 MG: 20 INJECTION INTRAVENOUS at 08:51

## 2025-06-18 RX ADMIN — HYDROMORPHONE HYDROCHLORIDE 1 MG: 1 INJECTION, SOLUTION INTRAMUSCULAR; INTRAVENOUS; SUBCUTANEOUS at 10:51

## 2025-06-18 RX ADMIN — SODIUM CHLORIDE, POTASSIUM CHLORIDE, SODIUM LACTATE AND CALCIUM CHLORIDE: 600; 310; 30; 20 INJECTION, SOLUTION INTRAVENOUS at 07:24

## 2025-06-18 RX ADMIN — DEXAMETHASONE SODIUM PHOSPHATE 8 MG: 4 INJECTION, SOLUTION INTRA-ARTICULAR; INTRALESIONAL; INTRAMUSCULAR; INTRAVENOUS; SOFT TISSUE at 07:38

## 2025-06-18 RX ADMIN — FENTANYL CITRATE 100 MCG: 50 INJECTION INTRAMUSCULAR; INTRAVENOUS at 07:47

## 2025-06-18 RX ADMIN — OFLOXACIN 50000 UNITS: 300 TABLET, COATED ORAL at 21:34

## 2025-06-18 RX ADMIN — GABAPENTIN 200 MG: 100 CAPSULE ORAL at 21:07

## 2025-06-18 RX ADMIN — MIDAZOLAM HYDROCHLORIDE 2 MG: 1 INJECTION, SOLUTION INTRAMUSCULAR; INTRAVENOUS at 07:24

## 2025-06-18 NOTE — ANESTHESIA PROCEDURE NOTES
Airway  Reason: elective    Date/Time: 6/18/2025 7:31 AM  Airway not difficult    General Information and Staff    Patient location during procedure: OR  CRNA/CAA: Kaden Echevarria CRNA    Indications and Patient Condition  Indications for airway management: airway protection    Preoxygenated: yes  MILS not maintained throughout    Mask difficulty assessment: 0 - not attempted    Final Airway Details    Final airway type: endotracheal airway      Successful airway: ETT  Cuffed: yes   Successful intubation technique: direct laryngoscopy  Endotracheal tube insertion site: oral  Blade: Rudolph  Blade size: 2  ETT size (mm): 7.5  Cormack-Lehane Classification: grade I - full view of glottis  Placement verified by: chest auscultation and capnometry   Measured from: lips  ETT/EBT  to lips (cm): 22  Number of attempts at approach: 1  Assessment: lips, teeth, and gum same as pre-op and atraumatic intubation    Additional Comments  Atraumatic ETT placement, dentition unchanged.

## 2025-06-18 NOTE — ANESTHESIA PROCEDURE NOTES
"Peripheral Block      Patient reassessed immediately prior to procedure    Patient location during procedure: OR  Start time: 6/18/2025 7:37 AM  Stop time: 6/18/2025 7:38 AM  Reason for block: at surgeon's request and post-op pain management  Performed by  CRNA/CAA: Jigna Ryan CRNA  Preanesthetic Checklist  Completed: patient identified, IV checked, site marked, risks and benefits discussed, surgical consent, monitors and equipment checked, pre-op evaluation and timeout performed  Prep:  Pt Position: supine  Sterile barriers:cap, gloves, sterile barriers and mask  Prep: ChloraPrep  Patient monitoring: blood pressure monitoring, continuous pulse oximetry and EKG  Procedure    Nursing cardiac assessment comments yes: Sedation, GA, Spinal,Epidural   Performed under: general  Guidance:ultrasound guided    ULTRASOUND INTERPRETATION.  Using ultrasound guidance a 20 G (20g 4\" Stimuplex) gauge needle was placed in close proximity to the nerve, at which point, under ultrasound guidance anesthetic was injected in the area of the nerve and spread of the anesthesia was seen on ultrasound in close proximity thereto.  There were no abnormalities seen on ultrasound; a digital image was taken; and the patient tolerated the procedure with no complications. Images:still images obtained    Laterality:Bilateral  Block Type:TAP  Injection Technique:single-shot  Needle Type:short-bevel  Needle Gauge:20 G  Resistance on Injection: none    Medications Used: ropivacaine (NAROPIN) injection 0.5 % - Peripheral Nerve   300 mg - 6/18/2025 7:38:00 AM  dexamethasone (DECADRON) injection - Injection   8 mg - 6/18/2025 7:38:00 AM      Medications  Comment:Block Injection:  Total volume divided equally between Right and Left block      Post Assessment  Injection Assessment: negative aspiration for heme, incremental injection and no paresthesia on injection  Patient Tolerance:comfortable throughout block  Complications:no  Additional " Notes  The pt was in the supine position under general anesthesia    Under Ultrasound guidance, a Lopez 4inch 360 degree needle was advanced with Normal Saline hydro dissection of tissue.  The Rectus and Transversus Abdominus muscles where visualized.  The needle tip was placed between the Transversus Abdominus and rectus abdominus, local anesthetic spread was visualized in the Transversus Abdominus Plane. Injection was made incrementally with aspiration every 5 mls.  There was no  intravascular injection,  injection pressure was normal, there was no neural injection, and the procedure was completed without difficulty. The same procedure was completed for left and right sided subcostal tap blocks. Thank You.      Performed by: Kaden Echevarria, CRNA

## 2025-06-18 NOTE — H&P
Patient Name:  Piotr Kc  YOB: 1960  [id number]    Date of Service: 6/18/2025       Patient Care Team:  Kiel Kline MD as PCP - General (Family Medicine)  Vanessa Dietrich APRN as Nurse Practitioner (Nurse Practitioner)      General Surgery History and Physical    Date: 06/18/25    Chief Complaint : ventral hernia     Subjective      Patient is a 64 y.o. male who presents with a ventral hernia repair. He has a history of an intestinal resection for crohns disease. Noticed an abdominal bulge that progressively got larger over time.           Allergy:   Allergies   Allergen Reactions    Keflex [Cephalexin] Rash     Beta lactam allergy details  Antibiotic reaction: rash  Age at reaction: adult  Dose to reaction time: days  Reason for antibiotic: unknown  Epinephrine required for reaction?: no  Tolerated antibiotics: amoxicillin, augmentin           Medications:  ceFAZolin, 3,000 mg, Intravenous, Once  sodium chloride, 10 mL, Intravenous, Q12H         No current facility-administered medications on file prior to encounter.     Current Outpatient Medications on File Prior to Encounter   Medication Sig    Blood Glucose Monitoring Suppl (Accu-Chek Dolores Plus) w/Device kit DEVISE FOR CHECKING BG    Budesonide (ENTOCORT EC) 3 MG 24 hr capsule Take 1 capsule by mouth Every Night.    colesevelam (WELCHOL) 625 MG tablet Take  by mouth Daily.    Continuous Glucose Sensor (Dexcom G6 Sensor) CHANGE EVERY 10 DAYS AS DIRECTED    Continuous Glucose Transmitter (Dexcom G6 Transmitter) misc FOR CHECKING BLOOD SUGAR, CHANGE EVERY 90 DAYS    esomeprazole (NexIUM) 40 MG packet Take 40 mg by mouth 2 (Two) Times a Day With Meals.    glucose blood test strip To test once daily to calibrate CGM.    Insulin Infusion Pump Supplies (AutoSoft XC Infusion Set) misc CHANGE EVERY THREE (3) DAYS    Insulin Infusion Pump Supplies (T:slim X2 3mL Cartridge) misc CHANGE EVERY THREE (3) DAYS    Lancets misc To test once  daily to calibrate CGM.    Motegrity 2 MG tablet Take 1 tablet by mouth Daily.    NovoLOG 100 UNIT/ML injection FOR USE IN INSULIN PUMP. MDD: 200 UNITS.    ubrogepant (UBRELVY) 100 MG tablet Take 1 tablet by mouth As Needed.    atorvastatin (LIPITOR) 40 MG tablet Take 1 tablet by mouth Every Night.    baclofen (LIORESAL) 10 MG tablet Take 1 tablet by mouth Daily As Needed.    Tirzepatide (Mounjaro) 12.5 MG/0.5ML solution auto-injector Inject 0.5 mL under the skin into the appropriate area as directed 1 (One) Time Per Week.    vitamin D (ERGOCALCIFEROL) 1.25 MG (85184 UT) capsule capsule Take 1 capsule by mouth 1 (One) Time Per Week.       PMHx:   Past Medical History:   Diagnosis Date    Cancer     skin    Cholelithiasis     Gallbladder was Removed by Dr Galvin    Colon polyp 2021    Crohn's colitis     Diabetes mellitus 12/1982    Type 1    Diabetes mellitus type I     Elevated cholesterol     Hernia of fascia     Hypertension     Insulin pump in place     Obesity     Stomach ulcer          Past Surgical History:   Past Surgical History:   Procedure Laterality Date    CHOLECYSTECTOMY N/A 02/06/2025    Procedure: CHOLECYSTECTOMY LAPAROSCOPIC;  Surgeon: Abel Galvin MD;  Location: [facility];  Service: General;  Laterality: N/A;    COLON SURGERY      colon resection    COLONOSCOPY      ENDOSCOPY      SKIN BIOPSY      TONSILLECTOMY           Family History: Noncontributory     Social History: Noncontributory      Review of Systems   Pertinent items are noted in HPI     Constitutional: No fevers, chills or malaise   Eyes: Denies visual changes    Cardiovascular: Denies chest pain, palpitations   Pulmonary: Denies cough or shortness of breath   Abdominal/ GI: See HPI    Genitourinary: Denies dysuria or hematuria   Musculoskeletal: Denies any but chronic joint aches, pains or deformities   Psychiatric: No recent mood changes   Neurologic: No paresthesias or loss of function          Objective     Physical  "Exam:    Vital Signs  /95 (BP Location: Right leg, Patient Position: Lying)   Pulse 74   Temp 97.7 °F (36.5 °C) (Oral)   Resp 20   Ht 188 cm (74\")   Wt 125 kg (275 lb 3.2 oz)   SpO2 93%   BMI 35.33 kg/m²   No intake or output data in the 24 hours ending 06/18/25 0657      Physical Exam:    Head: Normocephalic, atraumatic.   Eyes: Pupils equal, round, react to light   Mouth: No lesions noted  Neck: No masses, lymphadenopathy  CV: Regular rate and rhythm   Lungs: Bilateral chest rise and fall, no use of accessory muscles   Abdomen: Ventral hernia   Groin : No obvious hernias bilaterally  Extremities:  No cyanosis, clubbing or edema bilaterally   Lymphatics: No abnormal lymphadenopathy appreciated   Neurologic: No gross deficits      Assessment and Plan:    Problem List Items Addressed This Visit          Gastrointestinal Abdominal     * (Principal) Ventral hernia without obstruction or gangrene    Relevant Medications    sodium chloride 0.9 % flush 10 mL (Start on 6/18/2025  9:00 AM)    sodium chloride 0.9 % flush 10 mL    sodium chloride 0.9 % infusion 40 mL    ceFAZolin 3000 mg IVPB in 100 mL NS (VTB)    Other Relevant Orders    Follow Anesthesia Guidelines / Protocol    Verify NPO Status    Verify / Perform Chlorhexidine Skin Prep    Instructions on Coughing, Deep Breathing & Incentive Spirometry    Insert Peripheral IV    Saline Lock & Maintain IV Access    Place Sequential Compression Device    Maintain Sequential Compression Device        Active Hospital Problems    Diagnosis  POA    **Ventral hernia without obstruction or gangrene [K43.9]  Unknown      Resolved Hospital Problems   No resolved problems to display.      To OR for robotic ventral hernia repair.     Shy Garcia MD  06/18/25  06:57 EDT      "

## 2025-06-18 NOTE — ANESTHESIA PREPROCEDURE EVALUATION
Anesthesia Evaluation     Patient summary reviewed and Nursing notes reviewed   no history of anesthetic complications:   NPO Solid Status: > 8 hours  NPO Liquid Status: > 8 hours           Airway   Mallampati: I  TM distance: >3 FB  Neck ROM: full  No difficulty expected  Dental - normal exam     Pulmonary - negative pulmonary ROS and normal exam    breath sounds clear to auscultation  Cardiovascular - normal exam  Exercise tolerance: good (4-7 METS)    NYHA Classification: II  ECG reviewed  Rhythm: regular  Rate: normal    (+) hypertension, hyperlipidemia      Neuro/Psych- negative ROS  GI/Hepatic/Renal/Endo    (+) obesity, GERD, PUD, diabetes mellitus using insulin    Musculoskeletal (-) negative ROS    Abdominal  - normal exam    Bowel sounds: normal.   Substance History - negative use     OB/GYN negative ob/gyn ROS         Other      history of cancer                      Anesthesia Plan    ASA 2     general with block     intravenous induction     Anesthetic plan, risks, benefits, and alternatives have been provided, discussed and informed consent has been obtained with: patient.      CODE STATUS:

## 2025-06-18 NOTE — ANESTHESIA POSTPROCEDURE EVALUATION
Patient: Piotr Lyonscraft    Procedure Summary       Date: 06/18/25 Room / Location:  [facility]  /  [facility]    Anesthesia Start: 0724 Anesthesia Stop: 1024    Procedures:       VENTRAL / INCISIONAL HERNIA REPAIR LAPAROSCOPIC WITH DAVINCI ROBOT with mesh (Abdomen)      LAPAROSCOPIC LYSIS OF ADHESIONS WITH DAVINCI ROBOT (Abdomen) Diagnosis:       Ventral hernia without obstruction or gangrene      (Ventral hernia without obstruction or gangrene [K43.9])    Surgeons: Shy Garcia MD Provider: Kiel Vasquez MD    Anesthesia Type: general with block ASA Status: 2            Anesthesia Type: general with block    Vitals  Vitals Value Taken Time   /81 06/18/25 13:25   Temp 97.4 °F (36.3 °C) 06/18/25 11:16   Pulse 79 06/18/25 13:26   Resp 18 06/18/25 12:46   SpO2 98 % 06/18/25 13:26   Vitals shown include unfiled device data.        Post Anesthesia Care and Evaluation    Patient location during evaluation: PHASE II  Patient participation: complete - patient participated  Level of consciousness: awake and alert  Pain score: 1  Pain management: adequate    Airway patency: patent  Anesthetic complications: No anesthetic complications  PONV Status: controlled  Cardiovascular status: acceptable  Respiratory status: acceptable  Hydration status: acceptable

## 2025-06-18 NOTE — PLAN OF CARE
Goal Outcome Evaluation:  Plan of Care Reviewed With: patient, spouse        Progress: no change  Outcome Evaluation: Pt transferred from OR. VSS on RA. Pt ambulated in hallway. Pt moved to room 337, spouse at bedside. Will cont with POC.

## 2025-06-19 VITALS
SYSTOLIC BLOOD PRESSURE: 157 MMHG | TEMPERATURE: 98.5 F | OXYGEN SATURATION: 91 % | DIASTOLIC BLOOD PRESSURE: 74 MMHG | HEIGHT: 74 IN | WEIGHT: 288.31 LBS | RESPIRATION RATE: 18 BRPM | HEART RATE: 79 BPM | BODY MASS INDEX: 37 KG/M2

## 2025-06-19 LAB
ANION GAP SERPL CALCULATED.3IONS-SCNC: 9.6 MMOL/L (ref 5–15)
BUN SERPL-MCNC: 15 MG/DL (ref 8–23)
BUN/CREAT SERPL: 17.9 (ref 7–25)
CALCIUM SPEC-SCNC: 8.8 MG/DL (ref 8.6–10.5)
CHLORIDE SERPL-SCNC: 106 MMOL/L (ref 98–107)
CO2 SERPL-SCNC: 23.4 MMOL/L (ref 22–29)
CREAT SERPL-MCNC: 0.84 MG/DL (ref 0.76–1.27)
DEPRECATED RDW RBC AUTO: 44.2 FL (ref 37–54)
EGFRCR SERPLBLD CKD-EPI 2021: 98 ML/MIN/1.73
ERYTHROCYTE [DISTWIDTH] IN BLOOD BY AUTOMATED COUNT: 14.6 % (ref 12.3–15.4)
GLUCOSE SERPL-MCNC: 96 MG/DL (ref 65–99)
HCT VFR BLD AUTO: 37.9 % (ref 37.5–51)
HGB BLD-MCNC: 12.7 G/DL (ref 13–17.7)
MCH RBC QN AUTO: 28.1 PG (ref 26.6–33)
MCHC RBC AUTO-ENTMCNC: 33.5 G/DL (ref 31.5–35.7)
MCV RBC AUTO: 83.8 FL (ref 79–97)
PLATELET # BLD AUTO: 180 10*3/MM3 (ref 140–450)
PMV BLD AUTO: 10 FL (ref 6–12)
POTASSIUM SERPL-SCNC: 3.2 MMOL/L (ref 3.5–5.2)
RBC # BLD AUTO: 4.52 10*6/MM3 (ref 4.14–5.8)
SODIUM SERPL-SCNC: 139 MMOL/L (ref 136–145)
WBC NRBC COR # BLD AUTO: 11.37 10*3/MM3 (ref 3.4–10.8)

## 2025-06-19 PROCEDURE — 25010000002 PIPERACILLIN SOD-TAZOBACTAM PER 1 G: Performed by: SURGERY

## 2025-06-19 PROCEDURE — 80048 BASIC METABOLIC PNL TOTAL CA: CPT | Performed by: SURGERY

## 2025-06-19 PROCEDURE — 85027 COMPLETE CBC AUTOMATED: CPT | Performed by: SURGERY

## 2025-06-19 PROCEDURE — G0378 HOSPITAL OBSERVATION PER HR: HCPCS

## 2025-06-19 PROCEDURE — 99238 HOSP IP/OBS DSCHRG MGMT 30/<: CPT | Performed by: SURGERY

## 2025-06-19 RX ORDER — GABAPENTIN 100 MG/1
200 CAPSULE ORAL 3 TIMES DAILY
Qty: 42 CAPSULE | Refills: 0 | Status: SHIPPED | OUTPATIENT
Start: 2025-06-19 | End: 2025-06-26

## 2025-06-19 RX ORDER — METHOCARBAMOL 500 MG/1
500 TABLET, FILM COATED ORAL 3 TIMES DAILY
Qty: 21 TABLET | Refills: 0 | Status: SHIPPED | OUTPATIENT
Start: 2025-06-19 | End: 2025-06-26

## 2025-06-19 RX ORDER — OXYCODONE HYDROCHLORIDE 5 MG/1
5 TABLET ORAL EVERY 4 HOURS PRN
Qty: 30 TABLET | Refills: 0 | Status: SHIPPED | OUTPATIENT
Start: 2025-06-19 | End: 2026-06-19

## 2025-06-19 RX ADMIN — METHOCARBAMOL 750 MG: 750 TABLET ORAL at 08:22

## 2025-06-19 RX ADMIN — PRUCALOPRIDE 2 MG: 2 TABLET ORAL at 10:42

## 2025-06-19 RX ADMIN — OXYCODONE 5 MG: 5 TABLET ORAL at 11:56

## 2025-06-19 RX ADMIN — Medication 2000 UNITS: at 08:22

## 2025-06-19 RX ADMIN — PANTOPRAZOLE SODIUM 40 MG: 40 TABLET, DELAYED RELEASE ORAL at 06:31

## 2025-06-19 RX ADMIN — VALSARTAN 40 MG: 80 TABLET, FILM COATED ORAL at 08:22

## 2025-06-19 RX ADMIN — GABAPENTIN 200 MG: 100 CAPSULE ORAL at 08:22

## 2025-06-19 RX ADMIN — METHOCARBAMOL 750 MG: 750 TABLET ORAL at 11:55

## 2025-06-19 RX ADMIN — PIPERACILLIN AND TAZOBACTAM 3.38 G: 3; .375 INJECTION, POWDER, FOR SOLUTION INTRAVENOUS at 05:59

## 2025-06-19 NOTE — DISCHARGE INSTRUCTIONS
DISCHARGE INSTRUCTIONS    You may shower in 24 hours. It is important that you let soap and water run over your wound to keep it clean. Pat dry with clean towel. Wound does not need to be covered (you have stitches that will dissolve under your skin. You have surgical glue that will fall off on its own).  Do not soak in a tub or go in a pool/swim in water for 2 weeks.  Take over the counter acetaminophen (tylenol) or ibuprofen (advil/motrin) as needed for pain control. These medications may be alternated, follow the recommendations on the medication bottle. Take your prescribed narcotic pain medications as needed for additional pain control. (DO NOT DRIVE WHILE TAKING NARCOTIC PAIN MEDICATION)  Please notify the general surgery clinic should you develop redness, worsening drainage, fever, or increasing pain at your incision site.   Do not lift more than 15 pounds for 6 weeks. Wear your abdominal binder when moving for 2 weeks.     Clinic contact information:  [facility]  [facility] Location: [phone]

## 2025-06-19 NOTE — PLAN OF CARE
Goal Outcome Evaluation:  Plan of Care Reviewed With: patient        Progress: no change  Outcome Evaluation: Patient resting in bed. VSS on room air. Patient ambulated in room this shift. IV ABX administered per MAR. Patient voices no concerns at this time, will continue with POC.

## 2025-06-20 NOTE — OP NOTE
OPERATIVE NOTE  Patient Name:  Piotr Kc  YOB: 1960  [id number]    Date of Surgery:  6/18/25    PREOPERATIVE DIAGNOSIS: Incarcerated ventral incisional hernia     POSTOPERATIVE DIAGNOSIS: Same      PROCEDURE PERFORMED:   Lysis of adhesions (>1 hour)  Robotic ventral incisional hernia repair with intraperitoneal onlay mesh using da Brigida robot     SURGEON: Shy Garcia MD      SPECIMENS: None      EBL: 10     ANESTHESIA: General.      FINDINGS:   1. Large swiss-cheese style defect in midline of abdomen. Mutliple hernia defects present within midline. Approximately 10 small holes present creating an area 20 cm long x 10 cm wide.      INDICATIONS: The patient is a 64 y.o. M who presented to clinic with complaints of a symptomatic ventral incisional hernia. Their quality of life was significantly affected due to the size of the hernia. Risks and benefits of repair were discussed with the patient and they elected to proceed with hernia repair.     DESCRIPTION OF PROCEDURE:   After obtaining informed consent, the patient was taken to the operating room and placed in supine position. After appropriate DVT and antibiotic prophylaxis, general anesthesia was induced. TAP blocks were placed by the anesthesia staff bilaterally to aid in postop pain control. The abdomen was prepped and draped in standard sterile fashion.     I began with Veress needle entry at Servin's point.  Opening pressures were adequate. Once insufflated, I made an 8 mm incision on the right of the midline,  2 cm off the costal margin. I entered here using a laparoscopic camera.  I ensured with the laparoscope that no damage was done on entry by the Veress needle. I then placed the remaining 2 robotic trocars laterally on the right abdominal wall.  The robot was docked to the patient and the instruments were inserted.  There was a swiss cheese style defect present in the mid abdomen. There was incarcerated omentum, small bowel  and transverse colon.  I then began reducing the incarcerated omental fat within the hernia defects.  The colon was quite stuck within the most superior defect. This required significant lysis of adhesions in order to safely reduce the colon. Once the hernia contents were reduced, the defects were noted to measure 20 cm long x 10 cm wide.  I decreased the insufflation to 10.  The defects were closed with a running 0 V-Loc suture.   This allowed the hernia defects to come together nicely without any tension.  I then evaluated my plan for mesh placement.  I chose to perform an intraperitoneal onlay mesh using a phasix  ST.  To ensure adequate mesh coverage,a 25 cm long x 20 cm wide phasix ST mesh was chosen.  The inferior trocar was upsized to a 12 mm trocar in order for mesh entry.  The mesh was then centralized on the abdominal wall and sewn in place using multiple running 2-0 V-Loc sutures.  The mesh was sitting adequately without tension with adequate overlap. Two 2-0 vicryl sutures were used to secure the mesh to the abdominal wall. This completed the robotic portion of the operation.  I exited the console and returned to bedside for closure of the 12 mm trocar site using a Eliud-Felicia and 0 Vicryl suture.  The remaining ports were removed under direct visualization.  The skin was closed using a 4-0 Monocryl and covered with surgical glue.     At the end of the case the instrument count was correct.  The patient was awoken from anesthesia and transported to PACU for recovery.     COMPLICATIONS: None

## 2025-06-20 NOTE — DISCHARGE SUMMARY
Discharge Summary    Patient Name:  Piotr Kc  YOB: 1960  [id number]      DATE OF ADMISSION: 6/18/2025    DATE OF DISCHARGE: 6/19/25       FINAL DIAGNOSES:     Ventral hernia without obstruction or gangrene    Ventral hernia      CONSULTING SERVICES: None      PROCEDURES PERFORMED:   Robotic ventral incisional hernia repair    HISTORY: The patient is a very pleasant 64 y.o. male with a history of a large ventral incisional hernia.     HOSPITAL COURSE:  he is doing well today. They are tolerating a regular diet, mobilizing, and voiding.  Pain is appropriate and controlled. They feel comfortable with discharge today and are appropriate to do so.      CONDITION: At the time of discharge, the patient is stable.      DISCHARGE MEDICATIONS:   1. All previous home medications as reconciled.   2. Alternate over the counter Tylenol and ibuprofen for pain control.   3. Take prescribed oxycodone 5 mg every 6 hours as needed for additional pain control. [DO NOT DRIVE WHILE TAKING NARCOTIC PAIN MEDICATION]     DISCHARGE INSTRUCTIONS:  You may shower in 24 hours. It is important that you let soap and water run over your wound to keep it clean. Pat dry with clean towel. Wound does not need to be covered (you have stitches that will dissolve under your skin. You have surgical glue that will fall off on its own).  Do not soak in a tub or go in a pool/swim in water for 2 weeks.  Please notify the general surgery clinic should you develop redness, worsening drainage, fever, or increasing pain at your incision site.   Do not lift more than 15 pounds for 6 weeks. Please wear your abdominal binder when up and moving for the first 2 weeks after your hernia surgery, this does not need to be worn while sleeping unless desired for comfort.     Clinic contact information:  [facility]  [facility] Location: [phone]    Shy Garcia MD  6/20/2025  16:22 EDT

## 2025-07-31 ENCOUNTER — OFFICE VISIT (OUTPATIENT)
Age: 65
End: 2025-07-31
Payer: COMMERCIAL

## 2025-07-31 VITALS — HEIGHT: 74 IN | BODY MASS INDEX: 36.96 KG/M2 | WEIGHT: 288 LBS

## 2025-07-31 DIAGNOSIS — K43.9 VENTRAL HERNIA WITHOUT OBSTRUCTION OR GANGRENE: Primary | ICD-10-CM

## 2025-07-31 PROCEDURE — 99024 POSTOP FOLLOW-UP VISIT: CPT | Performed by: SURGERY

## 2025-08-01 NOTE — PROGRESS NOTES
"Patient Name:  Piotr Kc  YOB: 1960  [id number]    Follow Up Note- Post Op    Date of visit: 8/1/2025    Subjective   Subjective:   Presents today for post operative evaluation. Denies any complaints. Has recovered well from surgery.        Objective     Objective:     Ht 188 cm (74.02\")   Wt 131 kg (288 lb)   BMI 36.96 kg/m²       CV:  Regular rate and rhythm  L:  Bilateral lung rise and fall, no use of accessory muscles   Abd:  Soft, nontender, nondistended. Incisions clean and well healing.   Ext:  No cyanosis, clubbing, edema      Assessment/ Plan:  Assessment   Diagnoses and all orders for this visit:    1. Ventral hernia without obstruction or gangrene (Primary)      Presents for a post operative evaluation today. They are healing well from their surgery. Pathology reviewed. Follow up PRN.     Shy Burgos MD       General Surgeon  [facility]       "

## 2025-08-19 RX ORDER — TIRZEPATIDE 12.5 MG/.5ML
12.5 INJECTION, SOLUTION SUBCUTANEOUS WEEKLY
Qty: 2 ML | Refills: 2 | Status: SHIPPED | OUTPATIENT
Start: 2025-08-19

## 2025-08-19 RX ORDER — TIRZEPATIDE 12.5 MG/.5ML
12.5 INJECTION, SOLUTION SUBCUTANEOUS WEEKLY
Qty: 2 ML | Refills: 2 | Status: SHIPPED | OUTPATIENT
Start: 2025-08-19 | End: 2025-08-19 | Stop reason: SDUPTHER

## 2025-08-21 ENCOUNTER — OFFICE VISIT (OUTPATIENT)
Dept: ENDOCRINOLOGY | Facility: CLINIC | Age: 65
End: 2025-08-21
Payer: COMMERCIAL

## 2025-08-21 VITALS
WEIGHT: 262 LBS | OXYGEN SATURATION: 96 % | BODY MASS INDEX: 33.62 KG/M2 | DIASTOLIC BLOOD PRESSURE: 68 MMHG | HEART RATE: 87 BPM | SYSTOLIC BLOOD PRESSURE: 123 MMHG

## 2025-08-21 DIAGNOSIS — E13.9 DIABETES MELLITUS TYPE 1.5, MANAGED AS TYPE 2: Primary | ICD-10-CM

## 2025-08-21 LAB
GFR/BSA.PRED SERPLBLD CYS-BASED-ARV: 97 ML/MIN/{1.73_M2}
HBA1C MFR BLD: 6.8 %

## (undated) DEVICE — MONOPOLAR METZENBAUM SCISSOR TIP, DISPOSABLE: Brand: MONOPOLAR METZENBAUM SCISSOR TIP, DISPOSABLE

## (undated) DEVICE — MARKER,SKIN,WI/RULER AND LABELS: Brand: MEDLINE

## (undated) DEVICE — BLADELESS OBTURATOR: Brand: WECK VISTA

## (undated) DEVICE — ENDOPATH XCEL BLADELESS TROCARS WITH STABILITY SLEEVES: Brand: ENDOPATH XCEL

## (undated) DEVICE — PATIENT RETURN ELECTRODE, SINGLE-USE, CONTACT QUALITY MONITORING, ADULT, WITH 9FT CORD, FOR PATIENTS WEIGING OVER 33LBS. (15KG): Brand: MEGADYNE

## (undated) DEVICE — PK LAP GEN 70

## (undated) DEVICE — ADHS SKIN PREMIERPRO EXOFIN TOPICAL HI/VISC .5ML

## (undated) DEVICE — TIP COVER ACCESSORY

## (undated) DEVICE — GLV SURG PREMIERPRO MIC LTX PF SZ7.5 BRN

## (undated) DEVICE — HOLDER: Brand: DEROYAL

## (undated) DEVICE — Device

## (undated) DEVICE — 40595 XL TRENDELENBURG POSITIONING KIT: Brand: 40595 XL TRENDELENBURG POSITIONING KIT

## (undated) DEVICE — SET,IRRIGATION,CYSTO/TUR: Brand: MEDLINE

## (undated) DEVICE — ENDOPATH ELECTROSURGERY PROBE PLUS II 5MM RIGHT ANGLE MONOPOLAR ELECTROSURGERY SUCTION AND IRRRIGATION SHAFTS WITH RIGHT ANGLE ELECTRODE - USE ONLY WITH PROBE PLUS II HANDLES: Brand: ENDOPATH

## (undated) DEVICE — SEAL

## (undated) DEVICE — PAD GRND REM POLYHESIVE A/ DISP

## (undated) DEVICE — SHEAR HARM ENDOWRIST DAVINCI/X/XI BXLR FEN 8 REUS

## (undated) DEVICE — TROCAR: Brand: KII FIOS FIRST ENTRY

## (undated) DEVICE — SUT VIC 2/0 UR6 27IN J602H

## (undated) DEVICE — ARM DRAPE

## (undated) DEVICE — MONOPOLAR CURVED SCISSORS: Brand: ENDOWRIST

## (undated) DEVICE — SUT MNCRYL PLS ANTIB UD 4/0 PS2 18IN

## (undated) DEVICE — 1LYRTR 16FR10ML100%SIL UMS SNP: Brand: MEDLINE INDUSTRIES, INC.

## (undated) DEVICE — TROCAR: Brand: KII® SLEEVE

## (undated) DEVICE — ANTIBACTERIAL UNDYED BRAIDED (POLYGLACTIN 910), SYNTHETIC ABSORBABLE SUTURE: Brand: COATED VICRYL

## (undated) DEVICE — TUBING, SUCTION, 1/4" X 20', STRAIGHT: Brand: MEDLINE INDUSTRIES, INC.

## (undated) DEVICE — CVR DISP HUG U VAC STEEP TREND

## (undated) DEVICE — INSUFFLATION NEEDLE TO ESTABLISH PNEUMOPERITONEUM.: Brand: INSUFFLATION NEEDLE

## (undated) DEVICE — MEGA SUTURECUT ND: Brand: ENDOWRIST

## (undated) DEVICE — ENDOPATH ELECTROSURGERY PROBE PLUS II PISTOL HAND CONTROL PISTOL GRIP HANDLES WITH SUCTION AND IRRRIGATION FOR HAND CONTROL MONOPOLAR ELCTROSURGERY USE ONLY WITH PROBE PLUS II SHAFTS: Brand: ENDOPATH

## (undated) DEVICE — COVER,MAYO STAND,STERILE: Brand: MEDLINE

## (undated) DEVICE — 2, DISPOSABLE SUCTION/IRRIGATOR WITH DISPOSABLE TIP: Brand: STRYKEFLOW